# Patient Record
Sex: MALE | Race: WHITE | NOT HISPANIC OR LATINO | Employment: FULL TIME | URBAN - METROPOLITAN AREA
[De-identification: names, ages, dates, MRNs, and addresses within clinical notes are randomized per-mention and may not be internally consistent; named-entity substitution may affect disease eponyms.]

---

## 2017-01-27 ENCOUNTER — TELEPHONE (OUTPATIENT)
Dept: PRIMARY CARE CLINIC | Facility: CLINIC | Age: 42
End: 2017-01-27

## 2017-01-27 NOTE — TELEPHONE ENCOUNTER
----- Message from Emmanuelle Ambrose sent at 1/27/2017  3:52 PM CST -----  Contact: Self  Pt is calling requesting Staff contact regarding his prescription for Singular.  According to the the pharmacy, the insurance company needs Dr. Thakkar to initiate a 90 day prescription.    He can be reached at [phone].    Thank you.

## 2017-11-16 RX ORDER — ALBUTEROL SULFATE 90 UG/1
AEROSOL, METERED RESPIRATORY (INHALATION)
Qty: 8.5 INHALER | Refills: 0 | OUTPATIENT
Start: 2017-11-16

## 2017-11-27 ENCOUNTER — OFFICE VISIT (OUTPATIENT)
Dept: INTERNAL MEDICINE | Facility: CLINIC | Age: 42
End: 2017-11-27
Payer: COMMERCIAL

## 2017-11-27 ENCOUNTER — PATIENT MESSAGE (OUTPATIENT)
Dept: INTERNAL MEDICINE | Facility: CLINIC | Age: 42
End: 2017-11-27

## 2017-11-27 VITALS
TEMPERATURE: 98 F | SYSTOLIC BLOOD PRESSURE: 120 MMHG | HEART RATE: 60 BPM | HEIGHT: 69 IN | DIASTOLIC BLOOD PRESSURE: 80 MMHG | BODY MASS INDEX: 25.44 KG/M2 | WEIGHT: 171.75 LBS | OXYGEN SATURATION: 97 %

## 2017-11-27 DIAGNOSIS — J30.9 CHRONIC ALLERGIC RHINITIS, UNSPECIFIED SEASONALITY, UNSPECIFIED TRIGGER: ICD-10-CM

## 2017-11-27 DIAGNOSIS — Z00.00 HEALTH CARE MAINTENANCE: ICD-10-CM

## 2017-11-27 DIAGNOSIS — J06.9 UPPER RESPIRATORY TRACT INFECTION, UNSPECIFIED TYPE: Primary | ICD-10-CM

## 2017-11-27 DIAGNOSIS — K21.9 GASTROESOPHAGEAL REFLUX DISEASE, ESOPHAGITIS PRESENCE NOT SPECIFIED: ICD-10-CM

## 2017-11-27 PROCEDURE — 99214 OFFICE O/P EST MOD 30 MIN: CPT | Mod: S$GLB,,, | Performed by: INTERNAL MEDICINE

## 2017-11-27 PROCEDURE — 99999 PR PBB SHADOW E&M-EST. PATIENT-LVL III: CPT | Mod: PBBFAC,,, | Performed by: INTERNAL MEDICINE

## 2017-11-27 RX ORDER — MONTELUKAST SODIUM 5 MG/1
5 TABLET, CHEWABLE ORAL 2 TIMES DAILY
COMMUNITY
End: 2017-11-27 | Stop reason: SDUPTHER

## 2017-11-27 RX ORDER — MONTELUKAST SODIUM 5 MG/1
5 TABLET, CHEWABLE ORAL NIGHTLY
Qty: 30 TABLET | Refills: 11 | Status: SHIPPED | OUTPATIENT
Start: 2017-11-27 | End: 2019-07-12

## 2017-11-27 RX ORDER — HYDROGEN PEROXIDE 3 %
20 SOLUTION, NON-ORAL MISCELLANEOUS
Qty: 30 CAPSULE | Refills: 0 | Status: SHIPPED | OUTPATIENT
Start: 2017-11-27 | End: 2017-12-23 | Stop reason: SDUPTHER

## 2017-11-27 NOTE — PROGRESS NOTES
"Subjective:       Patient ID: Gayle Cline is a 41 y.o. male.    Chief Complaint: URI (pt complains of symptoms like nasal/chest congestion, excessive coughing, coughing up yellow mucus & irritable feeling in the laryngeal area. )    HPI   42 yo M here for urgent eval of URI sx with nasal and chest congestion, cough productive of yellow mucus and throat irritation.     Hx allergic rhinitis previously on singulair previously.     Started 4 days ago. Similar issue in August of last year that lasted through December.   claritin am and singulair qhs previously worked well.     Review of Systems   Constitutional: Negative for fever.   HENT: Positive for congestion and sore throat.    Respiratory: Positive for cough. Negative for shortness of breath.    Cardiovascular: Negative for chest pain.   Musculoskeletal: Negative.    Skin: Negative.        Objective:   /80 (BP Location: Right arm, Patient Position: Sitting, BP Method: Medium (Manual))   Pulse 60   Temp 98.2 °F (36.8 °C) (Oral)   Ht 5' 9" (1.753 m)   Wt 77.9 kg (171 lb 11.8 oz)   SpO2 97%   BMI 25.36 kg/m²      Physical Exam   Constitutional: He is oriented to person, place, and time. He appears well-developed and well-nourished. No distress.   HENT:   Head: Normocephalic and atraumatic.   Frequent dry cough/throat clearing   Cardiovascular: Normal rate and regular rhythm.    No murmur heard.  Pulmonary/Chest: Effort normal. No respiratory distress. He has no wheezes. He has no rales.   Neurological: He is alert and oriented to person, place, and time.   Skin: Skin is warm and dry. He is not diaphoretic.   Psychiatric: He has a normal mood and affect. His behavior is normal.       Small amount of exudate on right tonsil, cobblestoning    Assessment:       1. Upper respiratory tract infection, unspecified type    2. Chronic allergic rhinitis, unspecified seasonality, unspecified trigger    3. Gastroesophageal reflux disease, esophagitis presence not " specified    4. Health care maintenance        Plan:       Gayle was seen today for uri.    Diagnoses and all orders for this visit:    Upper respiratory tract infection, unspecified type  Supportive, rest, fluids, mucinex    Chronic allergic rhinitis, unspecified seasonality, unspecified trigger  -     montelukast (SINGULAIR) 5 MG chewable tablet; Take 1 tablet (5 mg total) by mouth every evening.    Gastroesophageal reflux disease, esophagitis presence not specified  -     esomeprazole (NEXIUM) 20 MG capsule; Take 1 capsule (20 mg total) by mouth before breakfast. X 4 weeks    Health care maintenance  -     CBC auto differential; Future  -     Comprehensive metabolic panel; Future  -     Hemoglobin A1c; Future  -     Lipid panel; Future  -     TSH; Future  -     HIV-1 and HIV-2 antibodies; Future  -     RPR; Future

## 2017-12-23 DIAGNOSIS — K21.9 GASTROESOPHAGEAL REFLUX DISEASE, ESOPHAGITIS PRESENCE NOT SPECIFIED: ICD-10-CM

## 2017-12-26 RX ORDER — HYDROGEN PEROXIDE 3 %
20 SOLUTION, NON-ORAL MISCELLANEOUS
Qty: 90 CAPSULE | Refills: 0 | Status: SHIPPED | OUTPATIENT
Start: 2017-12-26 | End: 2019-07-12

## 2019-04-02 ENCOUNTER — OFFICE VISIT (OUTPATIENT)
Dept: INTERNAL MEDICINE | Facility: CLINIC | Age: 44
End: 2019-04-02
Payer: COMMERCIAL

## 2019-04-02 VITALS
DIASTOLIC BLOOD PRESSURE: 80 MMHG | OXYGEN SATURATION: 98 % | BODY MASS INDEX: 25.44 KG/M2 | HEIGHT: 69 IN | WEIGHT: 171.75 LBS | TEMPERATURE: 98 F | SYSTOLIC BLOOD PRESSURE: 108 MMHG | HEART RATE: 69 BPM

## 2019-04-02 DIAGNOSIS — R05.9 COUGH: ICD-10-CM

## 2019-04-02 DIAGNOSIS — J30.9 CHRONIC ALLERGIC RHINITIS: ICD-10-CM

## 2019-04-02 DIAGNOSIS — G47.00 INSOMNIA, UNSPECIFIED TYPE: ICD-10-CM

## 2019-04-02 DIAGNOSIS — J40 BRONCHITIS: Primary | ICD-10-CM

## 2019-04-02 PROCEDURE — 3008F BODY MASS INDEX DOCD: CPT | Mod: CPTII,S$GLB,, | Performed by: NURSE PRACTITIONER

## 2019-04-02 PROCEDURE — 99213 PR OFFICE/OUTPT VISIT, EST, LEVL III, 20-29 MIN: ICD-10-PCS | Mod: 25,S$GLB,, | Performed by: NURSE PRACTITIONER

## 2019-04-02 PROCEDURE — 99213 OFFICE O/P EST LOW 20 MIN: CPT | Mod: 25,S$GLB,, | Performed by: NURSE PRACTITIONER

## 2019-04-02 PROCEDURE — 99999 PR PBB SHADOW E&M-EST. PATIENT-LVL III: CPT | Mod: PBBFAC,,, | Performed by: NURSE PRACTITIONER

## 2019-04-02 PROCEDURE — 96372 THER/PROPH/DIAG INJ SC/IM: CPT | Mod: S$GLB,,, | Performed by: NURSE PRACTITIONER

## 2019-04-02 PROCEDURE — 3008F PR BODY MASS INDEX (BMI) DOCUMENTED: ICD-10-PCS | Mod: CPTII,S$GLB,, | Performed by: NURSE PRACTITIONER

## 2019-04-02 PROCEDURE — 96372 PR INJECTION,THERAP/PROPH/DIAG2ST, IM OR SUBCUT: ICD-10-PCS | Mod: S$GLB,,, | Performed by: NURSE PRACTITIONER

## 2019-04-02 PROCEDURE — 99999 PR PBB SHADOW E&M-EST. PATIENT-LVL III: ICD-10-PCS | Mod: PBBFAC,,, | Performed by: NURSE PRACTITIONER

## 2019-04-02 RX ORDER — FLUTICASONE PROPIONATE 50 MCG
1 SPRAY, SUSPENSION (ML) NASAL DAILY
Qty: 16 G | Refills: 12 | Status: SHIPPED | OUTPATIENT
Start: 2019-04-02 | End: 2019-07-12 | Stop reason: SDUPTHER

## 2019-04-02 RX ORDER — AZITHROMYCIN 250 MG/1
TABLET, FILM COATED ORAL
Qty: 6 TABLET | Refills: 0 | Status: SHIPPED | OUTPATIENT
Start: 2019-04-02 | End: 2019-04-07

## 2019-04-02 RX ORDER — BETAMETHASONE SODIUM PHOSPHATE AND BETAMETHASONE ACETATE 3; 3 MG/ML; MG/ML
6 INJECTION, SUSPENSION INTRA-ARTICULAR; INTRALESIONAL; INTRAMUSCULAR; SOFT TISSUE
Status: COMPLETED | OUTPATIENT
Start: 2019-04-02 | End: 2019-04-02

## 2019-04-02 RX ORDER — BENZONATATE 100 MG/1
100 CAPSULE ORAL 3 TIMES DAILY PRN
Qty: 30 CAPSULE | Refills: 0 | Status: SHIPPED | OUTPATIENT
Start: 2019-04-02 | End: 2019-04-12

## 2019-04-02 RX ADMIN — BETAMETHASONE SODIUM PHOSPHATE AND BETAMETHASONE ACETATE 6 MG: 3; 3 INJECTION, SUSPENSION INTRA-ARTICULAR; INTRALESIONAL; INTRAMUSCULAR; SOFT TISSUE at 11:04

## 2019-04-02 NOTE — PROGRESS NOTES
INTERNAL MEDICINE URGENT CARE NOTE    CHIEF COMPLAINT     Chief Complaint   Patient presents with    Cough     x3 days    Sore Throat    Nasal Congestion    Chest Congestion       HPI     Gayle Cline is a 43 y.o. male with GERD and AR who presents for an urgent visit today.  He is an established pt of Dr Stock     Here with c/o sore throat, nasal congestion, chest congestion and cough x 6 days with acute worsening 2 days ago.   Cough- prod with yellow sputum   No wheezing or SOB.   No fever or chills.   Treating with mucinex multi-system yesterday     Also with c/o insomnia - trouble staying asleep. Treats with z-quil. +snoring       Past Medical History:  Past Medical History:   Diagnosis Date    Chronic allergic rhinitis        Home Medications:  Prior to Admission medications    Medication Sig Start Date End Date Taking? Authorizing Provider   esomeprazole (NEXIUM) 20 MG capsule Take 1 capsule (20 mg total) by mouth before breakfast. 12/26/17 12/26/18  Ada Stock MD   fluticasone (FLONASE) 50 mcg/actuation nasal spray 1 spray by Each Nare route once daily. Take during Spring and Fall. 10/18/16   Jorden Thakkar MD   loratadine (CLARITIN) 10 mg tablet Take 1 tablet (10 mg total) by mouth daily as needed for Allergies. 10/18/16 11/27/17  Jorden Thakkar MD   montelukast (SINGULAIR) 5 MG chewable tablet Take 1 tablet (5 mg total) by mouth every evening. 11/27/17   Ada Stock MD       Review of Systems:  Review of Systems   Constitutional: Negative for chills, fatigue and fever.   HENT: Positive for congestion, postnasal drip and sinus pressure. Negative for rhinorrhea, sinus pain and sore throat.    Respiratory: Positive for cough. Negative for shortness of breath and wheezing.    Cardiovascular: Negative for chest pain and palpitations.   Gastrointestinal: Negative for abdominal pain, constipation, diarrhea, nausea and vomiting.   Skin: Negative for rash.   Neurological: Positive for  "headaches. Negative for dizziness and light-headedness.       Health Maintainence:   Immunizations:  Health Maintenance       Date Due Completion Date    TETANUS VACCINE 12/24/1994 ---    Lipid Panel 07/09/2018 7/9/2013    Influenza Vaccine 08/01/2018 ---           PHYSICAL EXAM     /80 (BP Location: Left arm, Patient Position: Sitting, BP Method: Large (Manual))   Pulse 69   Temp 98.1 °F (36.7 °C) (Oral)   Ht 5' 9" (1.753 m)   Wt 77.9 kg (171 lb 11.8 oz)   SpO2 98%   BMI 25.36 kg/m²     Physical Exam   Constitutional: He is oriented to person, place, and time. He appears well-developed and well-nourished.   HENT:   Head: Normocephalic.   Right Ear: External ear normal. Tympanic membrane is erythematous.   Left Ear: External ear normal. Tympanic membrane is erythematous.   Nose: Right sinus exhibits maxillary sinus tenderness and frontal sinus tenderness. Left sinus exhibits maxillary sinus tenderness and frontal sinus tenderness.   Mouth/Throat: Oropharynx is clear and moist and mucous membranes are normal. No oropharyngeal exudate.   Eyes: Pupils are equal, round, and reactive to light.   Neck: No JVD present. No tracheal deviation present. No thyromegaly present.   Cardiovascular: Normal rate, regular rhythm and intact distal pulses. Exam reveals no gallop and no friction rub.   No murmur heard.  Pulmonary/Chest: No respiratory distress. He has wheezes. He has no rales. He exhibits no tenderness.   Abdominal: Soft. Bowel sounds are normal. He exhibits no distension. There is no tenderness.   Musculoskeletal: Normal range of motion. He exhibits no edema or tenderness.   Lymphadenopathy:     He has no cervical adenopathy.   Neurological: He is alert and oriented to person, place, and time.   Skin: Skin is warm and dry. No rash noted.   Psychiatric: He has a normal mood and affect. His behavior is normal.   Vitals reviewed.      LABS     No results found for: LABA1C, HGBA1C  CMP  Sodium   Date Value Ref " Range Status   07/09/2013 137 136 - 145 mmol/L Final     Potassium   Date Value Ref Range Status   07/09/2013 4.4 3.5 - 5.1 mmol/L Final     Chloride   Date Value Ref Range Status   07/09/2013 102 95 - 110 mmol/L Final     CO2   Date Value Ref Range Status   07/09/2013 26 23 - 29 mmol/L Final     Glucose   Date Value Ref Range Status   07/09/2013 88 70 - 110 mg/dl Final     BUN, Bld   Date Value Ref Range Status   07/09/2013 13 6 - 20 mg/dl Final     Creatinine   Date Value Ref Range Status   07/09/2013 1.0 0.5 - 1.4 mg/dl Final     Calcium   Date Value Ref Range Status   07/09/2013 10.2 8.7 - 10.5 mg/dl Final     Total Protein   Date Value Ref Range Status   07/09/2013 7.4 6.0 - 8.4 g/dL Final     Albumin   Date Value Ref Range Status   07/09/2013 4.5 3.5 - 5.2 g/dl Final     Total Bilirubin   Date Value Ref Range Status   07/09/2013 0.9 0.1 - 1.0 mg/dl Final     Comment:     For infants and newborns, interpretation of results should be based  on gestational age, weight and in agreement with clinical  observations.  .  Premature Infant recommended reference ranges:  Up to 24 hours.............<8.0 mg/dl  Up to 48 hours............<12.0 mg/dl  3-5 days..................<15.0 mg/dl  6-29 days.................<15.0 mg/dl     Alkaline Phosphatase   Date Value Ref Range Status   07/09/2013 35 (L) 55 - 135 U/L Final     AST   Date Value Ref Range Status   07/09/2013 24 10 - 40 U/L Final     ALT   Date Value Ref Range Status   07/09/2013 21 10 - 44 U/L Final     Anion Gap   Date Value Ref Range Status   07/09/2013 9.0 8 - 16 mmol/L Final     eGFR if    Date Value Ref Range Status   07/09/2013 >60 >60 mL/min Final     Comment:     Estimated glomerular filtration rate (eGFR) is normalized to an  average body surface area of 1.73 square meters.  The calculation  used to obtain the eGFR is the adjusted MDRD equation, which factors  patient sex, age, race, and creatinine result.  Since race is unknown  in our  information system, the eGFR values for -American  and Non--American patients are given for each creatinine  result.     eGFR if non    Date Value Ref Range Status   07/09/2013 >60 >60 mL/min Final     Lab Results   Component Value Date    WBC 7.47 07/09/2013    HGB 14.1 07/09/2013    HCT 43.2 07/09/2013    MCV 88.5 07/09/2013     07/09/2013     Lab Results   Component Value Date    CHOL 219 (H) 07/09/2013     Lab Results   Component Value Date    HDL 55 07/09/2013     Lab Results   Component Value Date    LDLCALC 153.0 07/09/2013     Lab Results   Component Value Date    TRIG 53 07/09/2013     Lab Results   Component Value Date    CHOLHDL 25.1 07/09/2013     No results found for: TSH, P0NIGRU, B9YNJVW, THYROIDAB    ASSESSMENT/PLAN     Gayle Cline is a 43 y.o. male with  Past Medical History:   Diagnosis Date    Chronic allergic rhinitis      Bronchitis- will start azithromycin as directed. [facility] IM in office. Tessalon pearls as needed. Mucinex bid with full glass of water   -     azithromycin (Z-ANISH) 250 MG tablet; Take 2 tablets by mouth on day 1; Take 1 tablet by mouth on days 2-5  Dispense: 6 tablet; Refill: 0  -     betamethasone acetate-betamethasone sodium phosphate injection 6 mg  -     benzonatate (TESSALON) 100 MG capsule; Take 1 capsule (100 mg total) by mouth 3 (three) times daily as needed.  Dispense: 30 capsule; Refill: 0    Cough  -     betamethasone acetate-betamethasone sodium phosphate injection 6 mg  -     benzonatate (TESSALON) 100 MG capsule; Take 1 capsule (100 mg total) by mouth 3 (three) times daily as needed.  Dispense: 30 capsule; Refill: 0    Chronic allergic rhinitis- cont Flonase. Will hold Claritin x1 week   -     fluticasone (FLONASE) 50 mcg/actuation nasal spray; 1 spray (50 mcg total) by Each Nare route once daily. Take during Spring and Fall.  Dispense: 16 g; Refill: 12    Insomnia- will start melatonin 5mg and titrate up to 10mg as  needed. F/u with PCP     Follow up as needed and with PCP     Patient education provided from Karrie. Patient was counseled on when and how to seek emergent care.       Evangelina AMADOR, THIEN, FNP-c   Department of Internal Medicine - [facility]  11:36 AM

## 2019-07-12 ENCOUNTER — TELEPHONE (OUTPATIENT)
Dept: INTERNAL MEDICINE | Facility: CLINIC | Age: 44
End: 2019-07-12

## 2019-07-12 ENCOUNTER — LAB VISIT (OUTPATIENT)
Dept: LAB | Facility: HOSPITAL | Age: 44
End: 2019-07-12
Attending: INTERNAL MEDICINE
Payer: COMMERCIAL

## 2019-07-12 ENCOUNTER — OFFICE VISIT (OUTPATIENT)
Dept: INTERNAL MEDICINE | Facility: CLINIC | Age: 44
End: 2019-07-12
Payer: COMMERCIAL

## 2019-07-12 VITALS
SYSTOLIC BLOOD PRESSURE: 112 MMHG | OXYGEN SATURATION: 98 % | BODY MASS INDEX: 26.38 KG/M2 | HEIGHT: 69 IN | HEART RATE: 63 BPM | DIASTOLIC BLOOD PRESSURE: 74 MMHG | WEIGHT: 178.13 LBS

## 2019-07-12 DIAGNOSIS — Z76.89 ESTABLISHING CARE WITH NEW DOCTOR, ENCOUNTER FOR: ICD-10-CM

## 2019-07-12 DIAGNOSIS — E55.9 VITAMIN D INSUFFICIENCY: ICD-10-CM

## 2019-07-12 DIAGNOSIS — Z76.89 ESTABLISHING CARE WITH NEW DOCTOR, ENCOUNTER FOR: Primary | ICD-10-CM

## 2019-07-12 DIAGNOSIS — Z00.00 ANNUAL PHYSICAL EXAM: ICD-10-CM

## 2019-07-12 DIAGNOSIS — J30.9 CHRONIC ALLERGIC RHINITIS: ICD-10-CM

## 2019-07-12 LAB
25(OH)D3+25(OH)D2 SERPL-MCNC: 20 NG/ML (ref 30–96)
ALBUMIN SERPL BCP-MCNC: 4.5 G/DL (ref 3.5–5.2)
ALP SERPL-CCNC: 41 U/L (ref 55–135)
ALT SERPL W/O P-5'-P-CCNC: 15 U/L (ref 10–44)
ANION GAP SERPL CALC-SCNC: 9 MMOL/L (ref 8–16)
AST SERPL-CCNC: 20 U/L (ref 10–40)
BASOPHILS # BLD AUTO: 0.07 K/UL (ref 0–0.2)
BASOPHILS NFR BLD: 0.8 % (ref 0–1.9)
BILIRUB SERPL-MCNC: 0.6 MG/DL (ref 0.1–1)
BUN SERPL-MCNC: 8 MG/DL (ref 6–20)
CALCIUM SERPL-MCNC: 10 MG/DL (ref 8.7–10.5)
CHLORIDE SERPL-SCNC: 105 MMOL/L (ref 95–110)
CHOLEST SERPL-MCNC: 226 MG/DL (ref 120–199)
CHOLEST/HDLC SERPL: 3.4 {RATIO} (ref 2–5)
CO2 SERPL-SCNC: 27 MMOL/L (ref 23–29)
COMPLEXED PSA SERPL-MCNC: 0.4 NG/ML (ref 0–4)
CREAT SERPL-MCNC: 0.9 MG/DL (ref 0.5–1.4)
DIFFERENTIAL METHOD: NORMAL
EOSINOPHIL # BLD AUTO: 0.2 K/UL (ref 0–0.5)
EOSINOPHIL NFR BLD: 1.8 % (ref 0–8)
ERYTHROCYTE [DISTWIDTH] IN BLOOD BY AUTOMATED COUNT: 12.7 % (ref 11.5–14.5)
EST. GFR  (AFRICAN AMERICAN): >60 ML/MIN/1.73 M^2
EST. GFR  (NON AFRICAN AMERICAN): >60 ML/MIN/1.73 M^2
ESTIMATED AVG GLUCOSE: 105 MG/DL (ref 68–131)
GLUCOSE SERPL-MCNC: 94 MG/DL (ref 70–110)
HBA1C MFR BLD HPLC: 5.3 % (ref 4–5.6)
HCT VFR BLD AUTO: 44.3 % (ref 40–54)
HDLC SERPL-MCNC: 67 MG/DL (ref 40–75)
HDLC SERPL: 29.6 % (ref 20–50)
HGB BLD-MCNC: 14.7 G/DL (ref 14–18)
LDLC SERPL CALC-MCNC: 144.6 MG/DL (ref 63–159)
LYMPHOCYTES # BLD AUTO: 1.8 K/UL (ref 1–4.8)
LYMPHOCYTES NFR BLD: 21.5 % (ref 18–48)
MCH RBC QN AUTO: 29.9 PG (ref 27–31)
MCHC RBC AUTO-ENTMCNC: 33.2 G/DL (ref 32–36)
MCV RBC AUTO: 90 FL (ref 82–98)
MONOCYTES # BLD AUTO: 0.6 K/UL (ref 0.3–1)
MONOCYTES NFR BLD: 7.1 % (ref 4–15)
NEUTROPHILS # BLD AUTO: 5.7 K/UL (ref 1.8–7.7)
NEUTROPHILS NFR BLD: 68.8 % (ref 38–73)
NONHDLC SERPL-MCNC: 159 MG/DL
PLATELET # BLD AUTO: 274 K/UL (ref 150–350)
PMV BLD AUTO: 10.2 FL (ref 9.2–12.9)
POTASSIUM SERPL-SCNC: 4.4 MMOL/L (ref 3.5–5.1)
PROT SERPL-MCNC: 7.7 G/DL (ref 6–8.4)
RBC # BLD AUTO: 4.92 M/UL (ref 4.6–6.2)
SODIUM SERPL-SCNC: 141 MMOL/L (ref 136–145)
TRIGL SERPL-MCNC: 72 MG/DL (ref 30–150)
TSH SERPL DL<=0.005 MIU/L-ACNC: 1.3 UIU/ML (ref 0.4–4)
WBC # BLD AUTO: 8.33 K/UL (ref 3.9–12.7)

## 2019-07-12 PROCEDURE — 99396 PREV VISIT EST AGE 40-64: CPT | Mod: S$GLB,,, | Performed by: INTERNAL MEDICINE

## 2019-07-12 PROCEDURE — 86592 SYPHILIS TEST NON-TREP QUAL: CPT

## 2019-07-12 PROCEDURE — 86696 HERPES SIMPLEX TYPE 2 TEST: CPT

## 2019-07-12 PROCEDURE — 99999 PR PBB SHADOW E&M-EST. PATIENT-LVL III: CPT | Mod: PBBFAC,,, | Performed by: INTERNAL MEDICINE

## 2019-07-12 PROCEDURE — 80053 COMPREHEN METABOLIC PANEL: CPT

## 2019-07-12 PROCEDURE — 84443 ASSAY THYROID STIM HORMONE: CPT

## 2019-07-12 PROCEDURE — 80061 LIPID PANEL: CPT

## 2019-07-12 PROCEDURE — 86703 HIV-1/HIV-2 1 RESULT ANTBDY: CPT

## 2019-07-12 PROCEDURE — 36415 COLL VENOUS BLD VENIPUNCTURE: CPT

## 2019-07-12 PROCEDURE — 99999 PR PBB SHADOW E&M-EST. PATIENT-LVL III: ICD-10-PCS | Mod: PBBFAC,,, | Performed by: INTERNAL MEDICINE

## 2019-07-12 PROCEDURE — 99396 PR PREVENTIVE VISIT,EST,40-64: ICD-10-PCS | Mod: S$GLB,,, | Performed by: INTERNAL MEDICINE

## 2019-07-12 PROCEDURE — 83036 HEMOGLOBIN GLYCOSYLATED A1C: CPT

## 2019-07-12 PROCEDURE — 82306 VITAMIN D 25 HYDROXY: CPT

## 2019-07-12 PROCEDURE — 84153 ASSAY OF PSA TOTAL: CPT

## 2019-07-12 PROCEDURE — 85025 COMPLETE CBC W/AUTO DIFF WBC: CPT

## 2019-07-12 RX ORDER — DEXAMETHASONE 4 MG/1
4 TABLET ORAL EVERY 8 HOURS
Qty: 9 TABLET | Refills: 0 | Status: SHIPPED | OUTPATIENT
Start: 2019-07-12 | End: 2019-07-15

## 2019-07-12 RX ORDER — ACETAMINOPHEN 500 MG
1 TABLET ORAL DAILY
Qty: 90 CAPSULE | Refills: 3 | COMMUNITY
Start: 2019-07-12 | End: 2019-07-12 | Stop reason: SDUPTHER

## 2019-07-12 RX ORDER — IBUPROFEN 100 MG/5ML
2000 SUSPENSION, ORAL (FINAL DOSE FORM) ORAL DAILY
COMMUNITY
Start: 2019-07-12 | End: 2019-11-08

## 2019-07-12 RX ORDER — FLUTICASONE PROPIONATE 50 MCG
1 SPRAY, SUSPENSION (ML) NASAL DAILY
Qty: 3 BOTTLE | Refills: 3 | Status: SHIPPED | OUTPATIENT
Start: 2019-07-12 | End: 2020-10-27 | Stop reason: SDUPTHER

## 2019-07-12 RX ORDER — ACETAMINOPHEN 500 MG
1 TABLET ORAL DAILY
Qty: 90 CAPSULE | Refills: 3 | Status: SHIPPED | OUTPATIENT
Start: 2019-07-12

## 2019-07-12 NOTE — PROGRESS NOTES
INTERNAL MEDICINE CLINIC    Initial Visit to Establish Care    PRESENTING HISTORY     Previous PCP: Jorden Thakkar MD  Chief Complaint/Reason for Visit:     Chief Complaint   Patient presents with    Annual Exam     History of Present Illness & ROS : Mr. Gayle Cline is a 43 y.o. male.      Currently with right red eye.  He has increased allergy. Nose congested.  No coughing.    Review of Systems:  Answers for HPI/ROS submitted by the patient on 7/11/2019   activity change: No  unexpected weight change: No  neck pain: No  hearing loss: No  rhinorrhea: No  trouble swallowing: No  eye discharge: No  visual disturbance: No  chest tightness: No  wheezing: No  chest pain: No  palpitations: No  blood in stool: No  constipation: No  vomiting: No  diarrhea: No  polydipsia: No  polyuria: No  difficulty urinating: No  urgency: No  hematuria: No  joint swelling: No  arthralgias: No  headaches: No  weakness: No  confusion: No  dysphoric mood: No      PAST HISTORY:     Past Medical History:   Diagnosis Date    Chronic allergic rhinitis     Gastroesophageal reflux disease without esophagitis 10/18/2016    HPV in male     Rectal. Follow by Colonrectal        Past Surgical History:   Procedure Laterality Date    CHOLECYSTECTOMY  2012    Outside facilty       Family History   Problem Relation Age of Onset    Diabetes Father 60    Obesity Father     Stroke Maternal Grandfather     Heart disease Paternal Grandfather 60    Heart disease Maternal Uncle 40        CABG       Social History     Socioeconomic History    Marital status:      Spouse name: Magnolia    Number of children: Not on file    Years of education: Not on file    Highest education level: Not on file   Occupational History    Not on file   Social Needs    Financial resource strain: Not hard at all    Food insecurity:     Worry: Never true     Inability: Never true    Transportation needs:     Medical: No     Non-medical: No   Tobacco Use     Smoking status: Never Smoker    Smokeless tobacco: Never Used   Substance and Sexual Activity    Alcohol use: No     Frequency: Never     Drinks per session: Patient refused     Binge frequency: Never    Drug use: No    Sexual activity: Yes     Partners: Female   Lifestyle    Physical activity:     Days per week: 3 days     Minutes per session: 10 min    Stress: Only a little   Relationships    Social connections:     Talks on phone: More than three times a week     Gets together: Once a week     Attends Baptism service: Not on file     Active member of club or organization: No     Attends meetings of clubs or organizations: Never     Relationship status:    Other Topics Concern    Not on file   Social History Narrative    : Magnolia Xavier    One step son     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 Take during Spring and Fall. 16 g 12           loratadine (CLARITIN) 10 mg tablet Take 1 tablet (10 mg total) by mouth daily as needed for Allergies.  0            No current facility-administered medications on file prior to visit.         Review of patient's allergies indicates:  No Known Allergies    Medications Reconciliation:   I have reconciled the patient's home medications with the patient/family. I have updated all changes.  Refer to After-Visit Medication List.    OBJECTIVE:     Vital Signs:  Vitals:    07/12/19 1036   BP: 112/74   Pulse: 63     Wt Readings from Last 1 Encounters:   07/12/19 1036 80.8 kg (178 lb 2.1 oz)     Body mass index is 26.31 kg/m².     Physical Exam:  General: Well developed, well nourished. No distress.  HEENT: Head is normocephalic, atraumatic; ears are normal.    Eyes: Clear conjunctiva.  Neck: Supple, symmetrical neck; trachea  midline.  Lungs: Clear to auscultation bilaterally and normal respiratory effort.  Cardiovascular: Heart with regular rate and rhythm.    Extremities: No LE edema.    Abdomen: Abdomen is soft, non-tender non-distended with normal bowel sounds.  Musculoskeletal: Normal gait.   Genital:  Normal penis. No rash.  Scrotum and epididymis normal. Old small 2 mm fibrosis tissue on right scrotum (chronic).  No inguinal hernia.  No inguinal nodes. No rash found.  Rectal: normal prosate  Lymph Nodes: No cervical, supraclavicular or axillary adenopathy.  Psychiatric: Normal affect. Alert.    Laboratory  Lab Results   Component Value Date    WBC 7.47 07/09/2013    HGB 14.1 07/09/2013    HCT 43.2 07/09/2013     07/09/2013    CHOL 219 (H) 07/09/2013    TRIG 53 07/09/2013    HDL 55 07/09/2013    ALT 21 07/09/2013    AST 24 07/09/2013     07/09/2013    K 4.4 07/09/2013     07/09/2013    CREATININE 1.0 07/09/2013    BUN 13 07/09/2013    CO2 26 07/09/2013       ASSESSMENT & PLAN:     Establishing care with new doctor, encounter for  Annual physical exam  - Reviewed and updated past and current medical problems.  Discussed treatment of current medical problems    -     Lipid panel; Future; Expected date: 07/12/2019  -     CBC auto differential; Future; Expected date: 07/12/2019  -     Comprehensive metabolic panel; Future; Expected date: 07/12/2019  -     TSH; Future; Expected date: 07/12/2019  -     Hemoglobin A1c; Future; Expected date: 07/12/2019  -     PSA, Screening; Future; Expected date: 07/12/2019  -     Vitamin D; Future; Expected date: 01/08/2020  -     HIV 1/2 Ag/Ab (4th Gen); Future; Expected date: 07/12/2019  -     RPR; Future; Expected date: 07/12/2019  -     HERPES SIMPLEX 1&2 IGG; Future; Expected date: 07/12/2019    Chronic allergic rhinitis  -     fluticasone propionate (FLONASE) 50 mcg/actuation nasal spray; 1 spray (50 mcg total) by Each Nare route once daily. Take during Spring and Fall.   Dispense: 3 Bottle; Refill: 3  -     ascorbic acid, vitamin C, (VITAMIN C) 1000 MG tablet; Take 2 tablets (2,000 mg total) by mouth once daily.    Vitamin D Insufficiency  Rx Vitamin D3 2000 IU daily    Preventive Health Maintenance:  Tdap    Return to Clinic for Follow Up with me:  1 year.     Scheduled Follow-up :  No future appointments.    After Visit Medication List :     Medication List           Accurate as of 7/12/19 11:15 AM. If you have any questions, ask your nurse or doctor.               START taking these medications    ascorbic acid (vitamin C) 1000 MG tablet  Commonly known as:  VITAMIN C  Take 2 tablets (2,000 mg total) by mouth once daily.  Started by:  Jorden Thakkar MD     dexAMETHasone 4 MG Tab  Commonly known as:  DECADRON  Take 1 tablet (4 mg total) by mouth every 8 (eight) hours. for 3 days  Started by:  Jorden Thakkar MD        CONTINUE taking these medications    fluticasone propionate 50 mcg/actuation nasal spray  Commonly known as:  FLONASE  1 spray (50 mcg total) by Each Nare route once daily. Take during Spring and Fall.     loratadine 10 mg tablet  Commonly known as:  CLARITIN  Take 1 tablet (10 mg total) by mouth daily as needed for Allergies.        STOP taking these medications    esomeprazole 20 MG capsule  Commonly known as:  NEXIUM  Stopped by:  Jorden Thakkar MD     montelukast 5 MG chewable tablet  Commonly known as:  SINGULAIR  Stopped by:  Jorden Thakkar MD           Where to Get Your Medications      These medications were sent to [facility]/pharmacy #[id number] - [place], [place] - [address] AT [facility]  [address], [place] [address]    Phone:  [phone]   · fluticasone propionate 50 mcg/actuation nasal spray     These medications were sent to [facility]  [address] [place] [id number]    Hours:  Mon-Fri, 8a-5:30p Phone:  [phone]   · dexAMETHasone 4 MG Tab     You can get these medications from any pharmacy    You don't need a  prescription for these medications  · ascorbic acid (vitamin C) 1000 MG tablet         Signing Physician:  Jorden Thakkar MD

## 2019-07-15 LAB
HIV 1+2 AB+HIV1 P24 AG SERPL QL IA: NEGATIVE
RPR SER QL: NORMAL

## 2019-07-19 LAB
HSV1 IGG SERPL QL IA: NEGATIVE
HSV2 IGG SERPL QL IA: NEGATIVE

## 2019-08-12 ENCOUNTER — PATIENT MESSAGE (OUTPATIENT)
Dept: INTERNAL MEDICINE | Facility: CLINIC | Age: 44
End: 2019-08-12

## 2019-08-12 DIAGNOSIS — J30.9 CHRONIC ALLERGIC RHINITIS: Primary | ICD-10-CM

## 2019-08-12 DIAGNOSIS — F51.01 PRIMARY INSOMNIA: ICD-10-CM

## 2019-08-12 RX ORDER — NORTRIPTYLINE HYDROCHLORIDE 10 MG/1
10 CAPSULE ORAL NIGHTLY
Qty: 30 CAPSULE | Refills: 11 | Status: SHIPPED | OUTPATIENT
Start: 2019-08-12 | End: 2019-08-28

## 2019-08-13 ENCOUNTER — PATIENT MESSAGE (OUTPATIENT)
Dept: INTERNAL MEDICINE | Facility: CLINIC | Age: 44
End: 2019-08-13

## 2019-08-28 ENCOUNTER — PATIENT MESSAGE (OUTPATIENT)
Dept: INTERNAL MEDICINE | Facility: CLINIC | Age: 44
End: 2019-08-28

## 2019-08-28 DIAGNOSIS — R41.840 INATTENTION: Primary | ICD-10-CM

## 2019-08-28 RX ORDER — BUPROPION HYDROCHLORIDE 150 MG/1
150 TABLET ORAL NIGHTLY
Qty: 30 TABLET | Refills: 11 | Status: SHIPPED | OUTPATIENT
Start: 2019-08-28 | End: 2019-11-08

## 2019-10-14 ENCOUNTER — PATIENT OUTREACH (OUTPATIENT)
Dept: ADMINISTRATIVE | Facility: HOSPITAL | Age: 44
End: 2019-10-14

## 2019-10-24 ENCOUNTER — OFFICE VISIT (OUTPATIENT)
Dept: INTERNAL MEDICINE | Facility: CLINIC | Age: 44
End: 2019-10-24
Payer: COMMERCIAL

## 2019-10-24 ENCOUNTER — OFFICE VISIT (OUTPATIENT)
Dept: PULMONOLOGY | Facility: CLINIC | Age: 44
End: 2019-10-24
Payer: COMMERCIAL

## 2019-10-24 ENCOUNTER — PATIENT MESSAGE (OUTPATIENT)
Dept: INTERNAL MEDICINE | Facility: CLINIC | Age: 44
End: 2019-10-24

## 2019-10-24 VITALS
HEIGHT: 69 IN | OXYGEN SATURATION: 98 % | SYSTOLIC BLOOD PRESSURE: 124 MMHG | HEART RATE: 66 BPM | WEIGHT: 178 LBS | DIASTOLIC BLOOD PRESSURE: 88 MMHG | BODY MASS INDEX: 26.36 KG/M2

## 2019-10-24 VITALS
DIASTOLIC BLOOD PRESSURE: 82 MMHG | HEIGHT: 69 IN | HEART RATE: 64 BPM | OXYGEN SATURATION: 98 % | WEIGHT: 173.75 LBS | BODY MASS INDEX: 25.73 KG/M2 | SYSTOLIC BLOOD PRESSURE: 118 MMHG

## 2019-10-24 DIAGNOSIS — J32.9 CHRONIC SINUSITIS WITH RECURRENT BRONCHITIS: Primary | ICD-10-CM

## 2019-10-24 DIAGNOSIS — J40 BRONCHITIS: ICD-10-CM

## 2019-10-24 DIAGNOSIS — R05.9 COUGH: ICD-10-CM

## 2019-10-24 DIAGNOSIS — R05.9 COUGH: Primary | ICD-10-CM

## 2019-10-24 DIAGNOSIS — G47.00 INSOMNIA, UNSPECIFIED TYPE: ICD-10-CM

## 2019-10-24 DIAGNOSIS — J40 CHRONIC SINUSITIS WITH RECURRENT BRONCHITIS: Primary | ICD-10-CM

## 2019-10-24 PROCEDURE — 3008F BODY MASS INDEX DOCD: CPT | Mod: CPTII,S$GLB,, | Performed by: NURSE PRACTITIONER

## 2019-10-24 PROCEDURE — 99999 PR PBB SHADOW E&M-EST. PATIENT-LVL III: CPT | Mod: PBBFAC,,, | Performed by: NURSE PRACTITIONER

## 2019-10-24 PROCEDURE — 3008F PR BODY MASS INDEX (BMI) DOCUMENTED: ICD-10-PCS | Mod: CPTII,S$GLB,, | Performed by: NURSE PRACTITIONER

## 2019-10-24 PROCEDURE — 99204 PR OFFICE/OUTPT VISIT, NEW, LEVL IV, 45-59 MIN: ICD-10-PCS | Mod: S$GLB,,, | Performed by: NURSE PRACTITIONER

## 2019-10-24 PROCEDURE — 99204 OFFICE O/P NEW MOD 45 MIN: CPT | Mod: S$GLB,,, | Performed by: NURSE PRACTITIONER

## 2019-10-24 PROCEDURE — 99214 OFFICE O/P EST MOD 30 MIN: CPT | Mod: S$GLB,,, | Performed by: NURSE PRACTITIONER

## 2019-10-24 PROCEDURE — 99999 PR PBB SHADOW E&M-EST. PATIENT-LVL IV: CPT | Mod: PBBFAC,,, | Performed by: NURSE PRACTITIONER

## 2019-10-24 PROCEDURE — 99999 PR PBB SHADOW E&M-EST. PATIENT-LVL IV: ICD-10-PCS | Mod: PBBFAC,,, | Performed by: NURSE PRACTITIONER

## 2019-10-24 PROCEDURE — 99214 PR OFFICE/OUTPT VISIT, EST, LEVL IV, 30-39 MIN: ICD-10-PCS | Mod: S$GLB,,, | Performed by: NURSE PRACTITIONER

## 2019-10-24 PROCEDURE — 99999 PR PBB SHADOW E&M-EST. PATIENT-LVL III: ICD-10-PCS | Mod: PBBFAC,,, | Performed by: NURSE PRACTITIONER

## 2019-10-24 RX ORDER — HYDROXYZINE PAMOATE 50 MG/1
CAPSULE ORAL
Qty: 30 CAPSULE | Refills: 0 | Status: SHIPPED | OUTPATIENT
Start: 2019-10-24 | End: 2019-11-08

## 2019-10-24 RX ORDER — ALBUTEROL SULFATE 90 UG/1
2 AEROSOL, METERED RESPIRATORY (INHALATION)
Qty: 1 INHALER | Refills: 3 | Status: SHIPPED | OUTPATIENT
Start: 2019-10-24 | End: 2019-11-08

## 2019-10-24 RX ORDER — CODEINE PHOSPHATE AND GUAIFENESIN 10; 100 MG/5ML; MG/5ML
10 SOLUTION ORAL EVERY 8 HOURS PRN
Qty: 210 ML | Refills: 0 | Status: SHIPPED | OUTPATIENT
Start: 2019-10-24 | End: 2019-10-31

## 2019-10-24 RX ORDER — DOXYCYCLINE HYCLATE 100 MG
100 TABLET ORAL 2 TIMES DAILY
Qty: 14 TABLET | Refills: 0 | Status: SHIPPED | OUTPATIENT
Start: 2019-10-24 | End: 2019-10-31

## 2019-10-24 RX ORDER — PREDNISONE 20 MG/1
40 TABLET ORAL DAILY
Qty: 10 TABLET | Refills: 0 | Status: SHIPPED | OUTPATIENT
Start: 2019-10-24 | End: 2019-10-29

## 2019-10-24 NOTE — PROGRESS NOTES
"INTERNAL MEDICINE CLINIC - SAME DAY APPOINTMENT  Progress Note    PRESENTING HISTORY     PCP: Jorden Thakkar MD  Chief Complaint/Reason for Visit:   No chief complaint on file.      History of Present Illness & ROS : Mr. Gayle Cline is a 43 y.o. male.    Here for UC Visit.   Reports that has been having a cough and sorethroat that has not gotten better since last being seen in Sept for this, outside of [facility]. Describes "cough up" as "dark yellow" in color.   No fever or chills.   No SOB or chest discomforts.     Additionally,having increase "issues with sleep".   Trying Wellbutrin prescribed by Dr. Thakkar, but does not feel working very well. Has tired Melatonin (as much as 10 mg) in the past.      Review of Systems:  Eyes: denies visual changes at this time denies floaters   Cardiovascular: no chest pain or palpitations  Gastrointestinal: no nausea or vomiting, no abdominal pain or change in bowel habits  Genitourinary: no hematuria or dysuria; denies frequency  Hematologic/Lymphatic: no easy bruising or lymphadenopathy  Musculoskeletal: no arthralgias or myalgias  Neurological: no seizures or tremors  Endocrine: no heat or cold intolerance      PAST HISTORY:     Past Medical History:   Diagnosis Date    Chronic allergic rhinitis     Gastroesophageal reflux disease without esophagitis 10/18/2016    HPV in male     Rectal. Follow by Colonrectal     Vitamin D insufficiency 7/12/2019       Past Surgical History:   Procedure Laterality Date    CHOLECYSTECTOMY  2012    Outside facilty       Family History   Problem Relation Age of Onset    Diabetes Father 60    Obesity Father     Stroke Maternal Grandfather     Heart disease Paternal Grandfather 60    Heart disease Maternal Uncle 40        CABG       Social History     Socioeconomic History    Marital status:      Spouse name: Magnolia    Number of children: Not on file    Years of education: Not on file    Highest education level: Not on file "   Occupational History    Not on file   Social Needs    Financial resource strain: Not hard at all    Food insecurity:     Worry: Never true     Inability: Never true    Transportation needs:     Medical: No     Non-medical: No   Tobacco Use    Smoking status: Never Smoker    Smokeless tobacco: Never Used   Substance and Sexual Activity    Alcohol use: No     Frequency: Never     Drinks per session: Patient refused     Binge frequency: Never    Drug use: No    Sexual activity: Yes     Partners: Female   Lifestyle    Physical activity:     Days per week: 3 days     Minutes per session: 10 min    Stress: Only a little   Relationships    Social connections:     Talks on phone: More than three times a week     Gets together: Once a week     Attends Bahai service: Not on file     Active member of club or organization: No     Attends meetings of clubs or organizations: Never     Relationship status:    Other Topics Concern    Not on file   Social History Narrative    : Magnolia Xavier    One step son     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      buPROPion (WELLBUTRIN XL) 150 MG TB24 tablet Take 1 tablet (150 mg total) by mouth every evening. 30 tablet 11    cholecalciferol, vitamin D3, (VITAMIN D3) 2,000 unit Cap Take 1 capsule (2,000 Units total) by mouth once daily. 90 capsule 3    fluticasone propionate (FLONASE) 50 mcg/actuation nasal spray 1 spray (50 mcg total) by Each Nare route once daily. Take during Spring and Fall. 3 Bottle 3    loratadine (CLARITIN) 10 mg tablet Take 1 tablet (10 mg total) by mouth daily as needed for Allergies.  0     No current facility-administered medications on file prior to visit.         Review of patient's allergies  indicates:  No Known Allergies    Medications Reconciliation:   I have reconciled the patient's home medications with the patient/family. I have updated all changes.  Refer to After-Visit Medication List.    OBJECTIVE:     Vital Signs:  There were no vitals filed for this visit.  Wt Readings from Last 1 Encounters:   07/12/19 1036 80.8 kg (178 lb 2.1 oz)     There is no height or weight on file to calculate BMI.   Wt Readings from Last 3 Encounters:   10/24/19 80.7 kg (178 lb)   07/12/19 80.8 kg (178 lb 2.1 oz)   04/02/19 77.9 kg (171 lb 11.8 oz)     Temp Readings from Last 3 Encounters:   04/02/19 98.1 °F (36.7 °C) (Oral)   11/27/17 98.2 °F (36.8 °C) (Oral)   09/13/16 98.1 °F (36.7 °C) (Oral)     BP Readings from Last 3 Encounters:   10/24/19 124/88   07/12/19 112/74   04/02/19 108/80     Pulse Readings from Last 3 Encounters:   10/24/19 66   07/12/19 63   04/02/19 69       Physical Exam:  General: Well developed, well nourished. No distress.  HEENT: Head is normocephalic, atraumatic; ears are normal.   Eyes: Clear conjunctiva.  Neck: Supple, symmetrical neck; trachea midline.  Lungs:  Mild distant expiratory wheezing to LML and RML,otherwise, CTA  and normal with respiratory effort.  Cardiovascular: Heart with regular rate and rhythm. No murmurs, gallops or rubs  Extremities: No LE edema. Pulses 2+ and symmetric.   Abdomen: Abdomen is soft, non-tender non-distended with normal bowel sounds.  Skin: Skin color, texture, turgor normal. No rashes.  Musculoskeletal: Normal gait.   Lymph Nodes: No cervical or supraclavicular adenopathy.  Neurologic: Normal strength and tone. No focal numbness or weakness.   Psychiatric: Not depressed.      Laboratory  Lab Results   Component Value Date    WBC 8.33 07/12/2019    HGB 14.7 07/12/2019    HCT 44.3 07/12/2019     07/12/2019    CHOL 226 (H) 07/12/2019    TRIG 72 07/12/2019    HDL 67 07/12/2019    ALT 15 07/12/2019    AST 20 07/12/2019     07/12/2019    K 4.4  07/12/2019     07/12/2019    CREATININE 0.9 07/12/2019    BUN 8 07/12/2019    CO2 27 07/12/2019    TSH 1.302 07/12/2019    PSA 0.40 07/12/2019    HGBA1C 5.3 07/12/2019         ASSESSMENT & PLAN:     Here for  visit.  Est'd with Dr. Thakkar in 07/2019.       Acute on Chronic Cough in the setting of chronic seasonal allergies and Rhinitis, with suspected recurrent Bronchitis / chronic Sinusitis:   -     Ambulatory Referral to Pulmonology  -     doxycycline (VIBRA-TABS) 100 MG tablet; Take 1 tablet (100 mg total) by mouth 2 (two) times daily. for 7 days  Dispense: 14 tablet; Refill: 0  -     guaifenesin-codeine 100-10 mg/5 ml (TUSSI-ORGANIDIN NR)  mg/5 mL syrup; Take 10 mLs by mouth every 8 (eight) hours as needed for Cough.  Dispense: 210 mL; Refill: 0  -     predniSONE (DELTASONE) 20 MG tablet; Take 2 tablets (40 mg total) by mouth once daily. for 5 days  Dispense: 10 tablet; Refill: 0    Chronic Insomnia:    ` trial Vistaril   -     hydrOXYzine pamoate (VISTARIL) 50 MG Cap; Take  1/2 tab nightly PRN  Dispense: 30 capsule; Refill: 0      *Follow up with Dr. Thakkar has been advised.       Future Appointments   Date Time Provider Department Center   10/24/2019  1:00 PM Kimber[facility]NP [facility]   10/24/2019  3:30 PM BRANDO Rodriguez [facility]   12/18/2019  2:00 PM Jorden Thakkar MD [facility]        Medication List           Accurate as of October 24, 2019 12:26 PM. If you have any questions, ask your nurse or doctor.               START taking these medications    doxycycline 100 MG tablet  Commonly known as:  VIBRA-TABS  Take 1 tablet (100 mg total) by mouth 2 (two) times daily. for 7 days  Started by:  BRANDO Waldron     guaifenesin-codeine 100-10 mg/5 ml  mg/5 mL syrup  Commonly known as:  TUSSI-ORGANIDIN NR  Take 10 mLs by mouth every 8 (eight) hours as needed for Cough.  Started by:  Kandy Shelton, FNP     hydrOXYzine pamoate 50  MG Cap  Commonly known as:  VISTARIL  Take  1/2 tab nightly PRN  Started by:  BRANDO Waldron     predniSONE 20 MG tablet  Commonly known as:  DELTASONE  Take 2 tablets (40 mg total) by mouth once daily. for 5 days  Started by:  BRANDO Waldron        CONTINUE taking these medications    ascorbic acid (vitamin C) 1000 MG tablet  Commonly known as:  VITAMIN C  Take 2 tablets (2,000 mg total) by mouth once daily.     buPROPion 150 MG TB24 tablet  Commonly known as:  WELLBUTRIN XL  Take 1 tablet (150 mg total) by mouth every evening.     cholecalciferol (vitamin D3) 2,000 unit Cap  Commonly known as:  VITAMIN D3  Take 1 capsule (2,000 Units total) by mouth once daily.     fluticasone propionate 50 mcg/actuation nasal spray  Commonly known as:  FLONASE  1 spray (50 mcg total) by Each Nare route once daily. Take during Spring and Fall.     loratadine 10 mg tablet  Commonly known as:  CLARITIN  Take 1 tablet (10 mg total) by mouth daily as needed for Allergies.           Where to Get Your Medications      These medications were sent to [facility]/pharmacy #[id number] - [place] [address] [facility]  [address] [address]    Phone:  [phone]   · doxycycline 100 MG tablet  · guaifenesin-codeine 100-10 mg/5 ml  mg/5 mL syrup  · hydrOXYzine pamoate 50 MG Cap  · predniSONE 20 MG tablet         Signing Physician:  BRANDO Waldron

## 2019-10-24 NOTE — PROGRESS NOTES
Subjective:       Patient ID: Gayle Cline is a 43 y.o. male.    Chief Complaint: Cough    HPI:   Gayle Cline is a 43 y.o. male who presents for evaluation of a cough.  He just a bit ago was evaluated and treated for his cough.  He re[ports that he doesn't cough much but does get bronchitis often.  Gets colds, sinus infections and has severe allergies.  Had pneumonia in 2003 and has had bronchitis a couple of times since.  No lung problems as a child.  Light cigar smoker, maybe 5 a year.  No lung problems in family.      Review of Systems   Constitutional: Positive for fatigue. Negative for chills, activity change and night sweats.   HENT: Positive for postnasal drip and congestion. Negative for rhinorrhea and trouble swallowing.    Eyes: Negative for itching.   Respiratory: Positive for cough, hemoptysis (in the past ) and chest tightness. Negative for sputum production, choking, shortness of breath, wheezing, dyspnea on extertion and use of rescue inhaler.    Cardiovascular: Negative for chest pain and palpitations.   Genitourinary: Negative for difficulty urinating.   Endocrine: Negative for cold intolerance and heat intolerance.    Musculoskeletal: Negative for arthralgias.   Skin: Negative for rash.   Gastrointestinal: Negative for nausea, vomiting and acid reflux.   Neurological: Negative for dizziness and light-headedness.   Hematological: Negative for adenopathy.   Psychiatric/Behavioral: Negative for sleep disturbance.         Social History     Tobacco Use    Smoking status: Never Smoker    Smokeless tobacco: Never Used   Substance Use Topics    Alcohol use: No     Frequency: Never     Drinks per session: Patient refused     Binge frequency: Never       Review of patient's allergies indicates:  No Known Allergies  Past Medical History:   Diagnosis Date    Chronic allergic rhinitis     Gastroesophageal reflux disease without esophagitis 10/18/2016    HPV in male     Rectal. Follow by  Colonrectal     Vitamin D insufficiency 7/12/2019     Past Surgical History:   Procedure Laterality Date    CHOLECYSTECTOMY  2012    Outside facilty     Current Outpatient Medications on File Prior to Visit   Medication Sig    ascorbic acid, vitamin C, (VITAMIN C) 1000 MG tablet Take 2 tablets (2,000 mg total) by mouth once daily.    buPROPion (WELLBUTRIN XL) 150 MG TB24 tablet Take 1 tablet (150 mg total) by mouth every evening.    cholecalciferol, vitamin D3, (VITAMIN D3) 2,000 unit Cap Take 1 capsule (2,000 Units total) by mouth once daily.    doxycycline (VIBRA-TABS) 100 MG tablet Take 1 tablet (100 mg total) by mouth 2 (two) times daily. for 7 days    fluticasone propionate (FLONASE) 50 mcg/actuation nasal spray 1 spray (50 mcg total) by Each Nare route once daily. Take during Spring and Fall.    guaifenesin-codeine 100-10 mg/5 ml (TUSSI-ORGANIDIN NR)  mg/5 mL syrup Take 10 mLs by mouth every 8 (eight) hours as needed for Cough.    hydrOXYzine pamoate (VISTARIL) 50 MG Cap Take  1/2 tab nightly PRN    loratadine (CLARITIN) 10 mg tablet Take 1 tablet (10 mg total) by mouth daily as needed for Allergies.    predniSONE (DELTASONE) 20 MG tablet Take 2 tablets (40 mg total) by mouth once daily. for 5 days     No current facility-administered medications on file prior to visit.        Objective:      Vitals:    10/24/19 1309   BP: 118/82   Pulse: 64     Physical Exam   Constitutional: He is oriented to person, place, and time. He appears well-developed and well-nourished. No distress.   HENT:   Head: Normocephalic.   Neck: Normal range of motion. Neck supple.   Cardiovascular: Normal rate.   Pulmonary/Chest: Normal expansion. No respiratory distress. He has no decreased breath sounds. He has no wheezes. He has no rales.   Abdominal: Soft.   Musculoskeletal: Normal range of motion. He exhibits no edema.   Lymphadenopathy:     He has no axillary adenopathy.   Neurological: He is alert and oriented to  person, place, and time. Gait normal.   Skin: Skin is warm and dry. He is not diaphoretic. No erythema. Nails show no clubbing.   Psychiatric: He has a normal mood and affect.   Nursing note and vitals reviewed.    Personal Diagnostic Review          Assessment:     Problem List Items Addressed This Visit        Pulmonary    Cough - Primary    Relevant Orders    Spirometry with/without bronchodilator    DLCO-Carbon Monoxide Diffusing Capacity    LUNG VOLUMES    X-Ray Chest PA And Lateral    Bronchitis    Overview     Has been started on doxi, cough syrup, flonase and steroids.  Has not initiated therapy yet.          Current Assessment & Plan     CXR when able- patient unable to do today as he must return to work.  Await PFTs.  Likely that he will need to see allergy as he endorses extensive seasonal allergy symptoms. Start treatment plan initiated by NP Kandy Pitts.  Will add albuterol PRN.

## 2019-10-24 NOTE — LETTER
October 25, 2019      Kandy Shelton, [facility]  [address]  [facility] [address]           [facility]  [address]  [facility] [address]  Phone: [phone]          Patient: Gayle Cline   MR Number: [id number]   YOB: 1975   Date of Visit: 10/24/2019       Dear Kandy Shelton:    Thank you for referring Gayle Cline to me for evaluation. Attached you will find relevant portions of my assessment and plan of care.    If you have questions, please do not hesitate to call me. I look forward to following Gayle Cline along with you.    Sincerely,    Kimberley Hewitt, DNP    Enclosure  CC:  No Recipients    If you would like to receive this communication electronically, please contact [email or web address] or [phone] to request more information on [facility] access.    For providers and/or their staff who would like to refer a patient to [facility], please contact us through our one-stop-shop provider referral line, [facility], at [phone].    If you feel you have received this communication in error or would no longer like to receive these types of communications, please e-mail [email or web address]

## 2019-10-25 PROBLEM — J40 BRONCHITIS: Status: ACTIVE | Noted: 2019-10-25

## 2019-10-25 NOTE — ASSESSMENT & PLAN NOTE
CXR when able- patient unable to do today as he must return to work.  Await PFTs.  Likely that he will need to see allergy as he endorses extensive seasonal allergy symptoms. Start treatment plan initiated by HARIS Pitts.  Will add albuterol PRN.

## 2019-10-30 ENCOUNTER — HOSPITAL ENCOUNTER (OUTPATIENT)
Dept: PULMONOLOGY | Facility: CLINIC | Age: 44
Discharge: HOME | End: 2019-10-30
Payer: COMMERCIAL

## 2019-10-30 ENCOUNTER — HOSPITAL ENCOUNTER (OUTPATIENT)
Dept: RADIOLOGY | Facility: HOSPITAL | Age: 44
Discharge: HOME | End: 2019-10-30
Attending: NURSE PRACTITIONER
Payer: COMMERCIAL

## 2019-10-30 DIAGNOSIS — R05.9 COUGH: ICD-10-CM

## 2019-10-30 LAB
DLCO ADJ PRE: 29.37 ML/(MIN*MMHG) (ref 24.18–38.04)
DLCO SINGLE BREATH LLN: 24.18
DLCO SINGLE BREATH PRE REF: 94.4 %
DLCO SINGLE BREATH REF: 31.11
DLCOC SBVA LLN: 3.32
DLCOC SBVA PRE REF: 107.4 %
DLCOC SBVA REF: 4.61
DLCOC SINGLE BREATH LLN: 24.18
DLCOC SINGLE BREATH PRE REF: 94.4 %
DLCOC SINGLE BREATH REF: 31.11
DLCOCSBVAULN: 5.91
DLCOCSINGLEBREATHULN: 38.04
DLCOSINGLEBREATHULN: 38.04
DLCOVA LLN: 3.32
DLCOVA PRE REF: 107.4 %
DLCOVA PRE: 4.96 ML/(MIN*MMHG*L) (ref 3.32–5.91)
DLCOVA REF: 4.61
DLCOVAULN: 5.91
DLVAADJ PRE: 4.96 ML/(MIN*MMHG*L) (ref 3.32–5.91)
ERVN2 LLN: 1.38
ERVN2 PRE REF: 80.7 %
ERVN2 PRE: 1.11 L (ref 1.38–1.38)
ERVN2 REF: 1.38
ERVN2ULN: 1.38
FEF 25 75 LLN: 2.17
FEF 25 75 PRE REF: 96.7 %
FEF 25 75 REF: 3.78
FEV05 LLN: 1.81
FEV05 REF: 2.94
FEV1 FVC LLN: 70
FEV1 FVC PRE REF: 99.7 %
FEV1 FVC REF: 81
FEV1 LLN: 3.08
FEV1 PRE REF: 95.8 %
FEV1 REF: 3.9
FRCN2 LLN: 2.35
FRCN2 PRE REF: 74.3 %
FRCN2 REF: 3.34
FRCN2ULN: 4.32
FVC LLN: 3.85
FVC PRE REF: 95.7 %
FVC REF: 4.85
IVC PRE: 4.39 L (ref 3.85–5.86)
IVC SINGLE BREATH LLN: 3.85
IVC SINGLE BREATH PRE REF: 90.4 %
IVC SINGLE BREATH REF: 4.85
IVCSINGLEBREATHULN: 5.86
PEF LLN: 7.51
PEF PRE REF: 113.3 %
PEF REF: 9.7
PRE DLCO: 29.37 ML/(MIN*MMHG) (ref 24.18–38.04)
PRE FEF 25 75: 3.66 L/S (ref 2.17–5.4)
PRE FET 100: 7.06 SEC
PRE FEV05 REF: 101 %
PRE FEV1 FVC: 80.35 % (ref 70.04–91.12)
PRE FEV1: 3.73 L (ref 3.08–4.71)
PRE FEV5: 2.97 L (ref 1.81–4.08)
PRE FRC N2: 2.48 L
PRE FVC: 4.65 L (ref 3.85–5.86)
PRE PEF: 10.99 L/S (ref 7.51–11.9)
RVN2 LLN: 1.29
RVN2 PRE REF: 69.9 %
RVN2 PRE: 1.37 L (ref 1.29–2.63)
RVN2 REF: 1.96
RVN2TLCN2 LLN: 21.36
RVN2TLCN2 PRE REF: 75 %
RVN2TLCN2 PRE: 22.76 % (ref 21.36–39.32)
RVN2TLCN2 REF: 30.34
RVN2TLCN2ULN: 39.32
RVN2ULN: 2.63
TLCN2 LLN: 5.59
TLCN2 PRE REF: 89.3 %
TLCN2 PRE: 6.02 L (ref 5.59–7.89)
TLCN2 REF: 6.74
TLCN2ULN: 7.89
VA PRE: 5.95 L (ref 6.59–6.59)
VA SINGLE BREATH LLN: 6.59
VA SINGLE BREATH PRE REF: 90.2 %
VA SINGLE BREATH REF: 6.59
VASINGLEBREATHULN: 6.59
VCMAXN2 LLN: 3.85
VCMAXN2 PRE REF: 95.8 %
VCMAXN2 PRE: 4.65 L (ref 3.85–5.86)
VCMAXN2 REF: 4.85
VCMAXN2ULN: 5.86

## 2019-10-30 PROCEDURE — 94010 BREATHING CAPACITY TEST: CPT | Mod: S$GLB,,, | Performed by: INTERNAL MEDICINE

## 2019-10-30 PROCEDURE — 71046 X-RAY EXAM CHEST 2 VIEWS: CPT | Mod: 26,,, | Performed by: RADIOLOGY

## 2019-10-30 PROCEDURE — 94727 PR PULM FUNCTION TEST BY GAS: ICD-10-PCS | Mod: S$GLB,,, | Performed by: INTERNAL MEDICINE

## 2019-10-30 PROCEDURE — 94729 DIFFUSING CAPACITY: CPT | Mod: S$GLB,,, | Performed by: INTERNAL MEDICINE

## 2019-10-30 PROCEDURE — 94727 GAS DIL/WSHOT DETER LNG VOL: CPT | Mod: S$GLB,,, | Performed by: INTERNAL MEDICINE

## 2019-10-30 PROCEDURE — 71046 X-RAY EXAM CHEST 2 VIEWS: CPT | Mod: TC,FY

## 2019-10-30 PROCEDURE — 94010 BREATHING CAPACITY TEST: ICD-10-PCS | Mod: S$GLB,,, | Performed by: INTERNAL MEDICINE

## 2019-10-30 PROCEDURE — 71046 XR CHEST PA AND LATERAL: ICD-10-PCS | Mod: 26,,, | Performed by: RADIOLOGY

## 2019-10-30 PROCEDURE — 94729 PR C02/MEMBANE DIFFUSE CAPACITY: ICD-10-PCS | Mod: S$GLB,,, | Performed by: INTERNAL MEDICINE

## 2019-11-08 ENCOUNTER — CLINICAL SUPPORT (OUTPATIENT)
Dept: INTERNAL MEDICINE | Facility: CLINIC | Age: 44
End: 2019-11-08
Payer: COMMERCIAL

## 2019-11-08 ENCOUNTER — OFFICE VISIT (OUTPATIENT)
Dept: INTERNAL MEDICINE | Facility: CLINIC | Age: 44
End: 2019-11-08
Payer: COMMERCIAL

## 2019-11-08 VITALS
OXYGEN SATURATION: 98 % | HEART RATE: 79 BPM | DIASTOLIC BLOOD PRESSURE: 80 MMHG | WEIGHT: 174.38 LBS | BODY MASS INDEX: 25.83 KG/M2 | HEIGHT: 69 IN | TEMPERATURE: 97 F | SYSTOLIC BLOOD PRESSURE: 110 MMHG

## 2019-11-08 DIAGNOSIS — R05.8 ALLERGIC COUGH: ICD-10-CM

## 2019-11-08 DIAGNOSIS — F90.0 ADHD (ATTENTION DEFICIT HYPERACTIVITY DISORDER), INATTENTIVE TYPE: ICD-10-CM

## 2019-11-08 DIAGNOSIS — E55.9 VITAMIN D INSUFFICIENCY: ICD-10-CM

## 2019-11-08 DIAGNOSIS — J30.9 CHRONIC ALLERGIC RHINITIS: Primary | ICD-10-CM

## 2019-11-08 PROCEDURE — 3008F PR BODY MASS INDEX (BMI) DOCUMENTED: ICD-10-PCS | Mod: CPTII,S$GLB,, | Performed by: INTERNAL MEDICINE

## 2019-11-08 PROCEDURE — 90471 IMMUNIZATION ADMIN: CPT | Mod: S$GLB,,, | Performed by: INTERNAL MEDICINE

## 2019-11-08 PROCEDURE — 99999 PR PBB SHADOW E&M-EST. PATIENT-LVL I: ICD-10-PCS | Mod: PBBFAC,,,

## 2019-11-08 PROCEDURE — 99999 PR PBB SHADOW E&M-EST. PATIENT-LVL I: CPT | Mod: PBBFAC,,,

## 2019-11-08 PROCEDURE — 90715 TDAP VACCINE GREATER THAN OR EQUAL TO 7YO IM: ICD-10-PCS | Mod: S$GLB,,, | Performed by: INTERNAL MEDICINE

## 2019-11-08 PROCEDURE — 99214 OFFICE O/P EST MOD 30 MIN: CPT | Mod: 25,S$GLB,, | Performed by: INTERNAL MEDICINE

## 2019-11-08 PROCEDURE — 99999 PR PBB SHADOW E&M-EST. PATIENT-LVL IV: CPT | Mod: PBBFAC,,, | Performed by: INTERNAL MEDICINE

## 2019-11-08 PROCEDURE — 90471 TDAP VACCINE GREATER THAN OR EQUAL TO 7YO IM: ICD-10-PCS | Mod: S$GLB,,, | Performed by: INTERNAL MEDICINE

## 2019-11-08 PROCEDURE — 90715 TDAP VACCINE 7 YRS/> IM: CPT | Mod: S$GLB,,, | Performed by: INTERNAL MEDICINE

## 2019-11-08 PROCEDURE — 99214 PR OFFICE/OUTPT VISIT, EST, LEVL IV, 30-39 MIN: ICD-10-PCS | Mod: 25,S$GLB,, | Performed by: INTERNAL MEDICINE

## 2019-11-08 PROCEDURE — 99999 PR PBB SHADOW E&M-EST. PATIENT-LVL IV: ICD-10-PCS | Mod: PBBFAC,,, | Performed by: INTERNAL MEDICINE

## 2019-11-08 PROCEDURE — 3008F BODY MASS INDEX DOCD: CPT | Mod: CPTII,S$GLB,, | Performed by: INTERNAL MEDICINE

## 2019-11-08 RX ORDER — MONTELUKAST SODIUM 10 MG/1
10 TABLET ORAL NIGHTLY
Qty: 30 TABLET | Refills: 11 | Status: SHIPPED | OUTPATIENT
Start: 2019-11-08 | End: 2019-11-08 | Stop reason: SDUPTHER

## 2019-11-08 RX ORDER — ATOMOXETINE 40 MG/1
40 CAPSULE ORAL DAILY
Qty: 60 CAPSULE | Refills: 2 | Status: SHIPPED | OUTPATIENT
Start: 2019-11-08 | End: 2022-05-13

## 2019-11-08 RX ORDER — ATOMOXETINE 40 MG/1
40 CAPSULE ORAL DAILY
Qty: 60 CAPSULE | Refills: 2 | Status: SHIPPED | OUTPATIENT
Start: 2019-11-08 | End: 2019-11-08 | Stop reason: SDUPTHER

## 2019-11-08 RX ORDER — IBUPROFEN 100 MG/5ML
1000 SUSPENSION, ORAL (FINAL DOSE FORM) ORAL DAILY
COMMUNITY
Start: 2019-11-08

## 2019-11-08 RX ORDER — MONTELUKAST SODIUM 10 MG/1
10 TABLET ORAL NIGHTLY
Qty: 30 TABLET | Refills: 11 | Status: SHIPPED | OUTPATIENT
Start: 2019-11-08 | End: 2022-05-13

## 2019-11-08 NOTE — PROGRESS NOTES
INTERNAL MEDICINE CLINIC  Follow-up Visit Progress Note     PRESENTING HISTORY     PCP: Jorden Thakkar MD    Last Clinic Visit with me:  7-    Current Chief Complaint/Problem:    Chief Complaint   Patient presents with    Follow-up     History of Present Illness & ROS: Mr. Gayle Cline is a 43 y.o. male.    Saw Pulmonary  10-24-19  - Normal CXR. Normal PFT.    Mild cough again.   Sinus congestion.    Review of Systems:  Answers for HPI/ROS submitted by the patient on 11/6/2019   activity change: No  unexpected weight change: No  neck pain: No  hearing loss: No  rhinorrhea: No  trouble swallowing: No  eye discharge: No  visual disturbance: No  chest tightness: No  wheezing: No  chest pain: No  palpitations: No  blood in stool: No  constipation: No  vomiting: No  diarrhea: No  polydipsia: No  polyuria: No  difficulty urinating: No  urgency: No  hematuria: No  joint swelling: No  arthralgias: No  headaches: No  weakness: No  confusion: No  dysphoric mood: No      PAST HISTORY:     Past Medical History:   Diagnosis Date    Chronic allergic rhinitis     Gastroesophageal reflux disease without esophagitis 10/18/2016    HPV in male     Rectal. Follow by Colonrectal     Vitamin D insufficiency 7/12/2019       Past Surgical History:   Procedure Laterality Date    CHOLECYSTECTOMY  2012    Outside facilty       Family History   Problem Relation Age of Onset    Diabetes Father 60    Obesity Father     Stroke Maternal Grandfather     Heart disease Paternal Grandfather 60    Heart disease Maternal Uncle 40        CABG       Social History     Socioeconomic History    Marital status:      Spouse name: Magnolia    Number of children: Not on file    Years of education: Not on file    Highest education level: Not on file   Occupational History    Not on file   Social Needs    Financial resource strain: Not hard at all    Food insecurity:     Worry: Never true     Inability: Never true     Transportation needs:     Medical: No     Non-medical: No   Tobacco Use    Smoking status: Never Smoker    Smokeless tobacco: Never Used   Substance and Sexual Activity    Alcohol use: No     Frequency: Never     Drinks per session: Patient refused     Binge frequency: Never    Drug use: No    Sexual activity: Yes     Partners: Female   Lifestyle    Physical activity:     Days per week: 3 days     Minutes per session: 10 min    Stress: Only a little   Relationships    Social connections:     Talks on phone: More than three times a week     Gets together: Once a week     Attends Sabianism service: Not on file     Active member of club or organization: No     Attends meetings of clubs or organizations: Never     Relationship status:    Other Topics Concern    Not on file   Social History Narrative    : Magnolia Xavier    One step son     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 90 capsule 3    fluticasone propionate (FLONASE) 50 mcg/actuation nasal spray 1 spray (50 mcg total) by Each Nare route once daily. Take during Spring and Fall. 3 Bottle 3    loratadine (CLARITIN) 10 mg tablet Take 1 tablet (10 mg total) by mouth daily as needed for Allergies.  0           ascorbic acid, vitamin C, (VITAMIN C) 1000 MG tablet Take 2 tablets (2,000 mg total) by mouth once daily.         Review of patient's allergies indicates:  No Known Allergies    Medications Reconciliation:   I have reconciled the patient's home medications and discharge medications with the patient/family. I have updated all changes.  Refer to After-Visit Medication List.    OBJECTIVE:     Vital Signs:  Vitals:    11/08/19 1555   BP: 110/80   Pulse: 79   Temp: 97.3 °F (36.3 °C)     Wt Readings from  Last 1 Encounters:   11/08/19 1555 79.1 kg (174 lb 6.1 oz)     Body mass index is 25.75 kg/m².     Physical Exam:  General: Well developed, well nourished. No distress.  HEENT: Head is normocephalic, atraumatic. Mouth is clear.  Eyes: Clear conjunctiva.  Neck: Supple, symmetrical neck; trachea midline.  Lungs: Clear to auscultation bilaterally and normal respiratory effort.  Cardiovascular: Heart with regular rate and rhythm.    Extremities: No LE edema.    Abdomen: Abdomen is soft, non-tender non-distended with normal bowel sounds.  Skin: Skin color, texture, turgor normal. No rashes.  Psychiatric: Normal affect. Alert.    Laboratory  Lab Results   Component Value Date    WBC 8.33 07/12/2019    HGB 14.7 07/12/2019    HCT 44.3 07/12/2019     07/12/2019    CHOL 226 (H) 07/12/2019    TRIG 72 07/12/2019    HDL 67 07/12/2019    ALT 15 07/12/2019    AST 20 07/12/2019     07/12/2019    K 4.4 07/12/2019     07/12/2019    CREATININE 0.9 07/12/2019    BUN 8 07/12/2019    CO2 27 07/12/2019    TSH 1.302 07/12/2019    PSA 0.40 07/12/2019    HGBA1C 5.3 07/12/2019       ASSESSMENT & PLAN:     Chronic allergic rhinitis  Allergic cough  - Normal Pulmonary Workup (normal CXR and PFT).    No GERD.  - Plan:  Need Allergy and ENT evaluation.    -     ascorbic acid, vitamin C, (VITAMIN C) 1000 MG tablet; Take 1 tablet (1,000 mg total) by mouth once daily.  -     Ambulatory consult to ENT  -     Ambulatory consult to Allergy  -     montelukast (SINGULAIR) 10 mg tablet; Take 1 tablet (10 mg total) by mouth every evening.  Dispense: 30 tablet; Refill: 11    ADHD (attention deficit hyperactivity disorder), inattentive type  - Nortriptyline and Wellbutrin did not work.    Plan: Trial of Strattera 40-80 mg daily.  -     atomoxetine (STRATTERA) 40 MG capsule; Take 1 capsule (40 mg total) by mouth once daily.  Dispense: 60 capsule; Refill: 2    Vitamin D insufficiency  - On Vitamin D replacement 2000 IU daily.    Preventive  Health Maintenance:  Tdap and flu Rx given to the patient.    Return to Clinic for Follow Up with me:   December    Scheduled Follow-up :  Future Appointments   Date Time Provider Department Center   12/26/2019  1:30 PM Jorden Thakkar MD [facility] Nathan Esposito W       After Visit Medication List :     Medication List           Accurate as of November 8, 2019  4:30 PM. If you have any questions, ask your nurse or doctor.               START taking these medications    atomoxetine 40 MG capsule  Commonly known as:  STRATTERA  Take 1 capsule (40 mg total) by mouth once daily.  Started by:  Jorden Thakkar MD     montelukast 10 mg tablet  Commonly known as:  SINGULAIR  Take 1 tablet (10 mg total) by mouth every evening.  Started by:  Jorden Thakkar MD        CHANGE how you take these medications    ascorbic acid (vitamin C) 1000 MG tablet  Commonly known as:  VITAMIN C  Take 1 tablet (1,000 mg total) by mouth once daily.  What changed:  how much to take  Changed by:  Jorden Thakkar MD        CONTINUE taking these medications    cholecalciferol (vitamin D3) 2,000 unit Cap  Commonly known as:  VITAMIN D3  Take 1 capsule (2,000 Units total) by mouth once daily.     fluticasone propionate 50 mcg/actuation nasal spray  Commonly known as:  FLONASE  1 spray (50 mcg total) by Each Nare route once daily. Take during Spring and Fall.     loratadine 10 mg tablet  Commonly known as:  CLARITIN  Take 1 tablet (10 mg total) by mouth daily as needed for Allergies.        STOP taking these medications    albuterol 90 mcg/actuation inhaler  Commonly known as:  PROVENTIL HFA  Stopped by:  Jorden Thakkar MD     buPROPion 150 MG TB24 tablet  Commonly known as:  WELLBUTRIN XL  Stopped by:  Jorden Thakkar MD     hydrOXYzine pamoate 50 MG Cap  Commonly known as:  VISTARIL  Stopped by:  Jorden Thakkar MD           Where to Get Your Medications      These medications were sent to [facility]/pharmacy #[id number] - [place], [place] - [address]  [address],  [place] [address]    Phone:  [phone]   · atomoxetine 40 MG capsule  · montelukast 10 mg tablet     You can get these medications from any pharmacy    You don't need a prescription for these medications  · ascorbic acid (vitamin C) 1000 MG tablet         Signing Physician:  Jorden Thakkar MD

## 2019-11-09 ENCOUNTER — IMMUNIZATION (OUTPATIENT)
Dept: PHARMACY | Facility: CLINIC | Age: 44
End: 2019-11-09
Payer: COMMERCIAL

## 2019-11-15 ENCOUNTER — OFFICE VISIT (OUTPATIENT)
Dept: OTOLARYNGOLOGY | Facility: CLINIC | Age: 44
End: 2019-11-15
Payer: COMMERCIAL

## 2019-11-15 DIAGNOSIS — J30.9 CHRONIC ALLERGIC RHINITIS: ICD-10-CM

## 2019-11-15 DIAGNOSIS — J31.0 CHRONIC RHINITIS: Primary | ICD-10-CM

## 2019-11-15 PROCEDURE — 99999 PR PBB SHADOW E&M-EST. PATIENT-LVL II: ICD-10-PCS | Mod: PBBFAC,,, | Performed by: NURSE PRACTITIONER

## 2019-11-15 PROCEDURE — 99202 PR OFFICE/OUTPT VISIT, NEW, LEVL II, 15-29 MIN: ICD-10-PCS | Mod: S$GLB,,, | Performed by: NURSE PRACTITIONER

## 2019-11-15 PROCEDURE — 99202 OFFICE O/P NEW SF 15 MIN: CPT | Mod: S$GLB,,, | Performed by: NURSE PRACTITIONER

## 2019-11-15 PROCEDURE — 99999 PR PBB SHADOW E&M-EST. PATIENT-LVL II: CPT | Mod: PBBFAC,,, | Performed by: NURSE PRACTITIONER

## 2019-11-15 RX ORDER — HYDROXYZINE PAMOATE 25 MG/1
CAPSULE ORAL
Qty: 30 CAPSULE | Refills: 1 | OUTPATIENT
Start: 2019-11-15

## 2019-11-15 NOTE — LETTER
November 15, 2019      Jorden Thakkar MD  [address] [facility] [address]  [place] [address]           Nathan shaq - Otorhinolaryngology  [address]  [facility] [address]  Phone: [phone]  Fax: [phone]          Patient: Gayle Cline   MR Number: [id number]   YOB: 1975   Date of Visit: 11/15/2019       Dear Dr. Jorden Thakkar:    Thank you for referring Gayle Cline to me for evaluation. Attached you will find relevant portions of my assessment and plan of care.    If you have questions, please do not hesitate to call me. I look forward to following Gayle Cline along with you.    Sincerely,    Kasey Nielsen, NP    Enclosure  CC:  No Recipients    If you would like to receive this communication electronically, please contact [email or web address] or [phone] to request more information on [employer] Link access.    For providers and/or their staff who would like to refer a patient to [facility], please contact us through our one-stop-shop provider referral line, [facility], at [phone].    If you feel you have received this communication in error or would no longer like to receive these types of communications, please e-mail [email or web address]

## 2019-11-15 NOTE — PROGRESS NOTES
Subjective:      Gayle Cline is a 43 y.o. male who was referred to me by Dr. Jorden Thakkar in consultation for sinusitis.    Mr. Cline presents with persistent rhinorrhea with associated nasal congestion, post-nasal drip, and intermittent frontal sinus pressure for the past several years. He has tried fluticasone and Claritin with limited benefit. He states he had recently started oral Singulair with some improvement of symptoms. He reports his symptoms are better today than the weeks past. He reports that he has an appointment with allergy clinic next week. He states he has been treated for several sinus infection with antibiotics. Last antibiotic was completed 3 weeks ago with benefit.     Current sinonasal medications as above.  He does not regularly use nasal decongestant sprays.    He does not recall previously having allergy testing.    He denies a history of asthma.    He denies a history of reflux symptoms.  He has not previously had an EGD.    He denies have a diagnosis of obstructive sleep apnea.     He has not had sinonasal surgery.    He does not recall a prior history of nasal trauma.      Past Medical History  He has a past medical history of Chronic allergic rhinitis, Gastroesophageal reflux disease without esophagitis, HPV in male, and Vitamin D insufficiency.    Past Surgical History  He has a past surgical history that includes Cholecystectomy (2012).    Family History  His family history includes Diabetes (age of onset: 60) in his father; Heart disease (age of onset: 40) in his maternal uncle; Heart disease (age of onset: 60) in his paternal grandfather; Obesity in his father; Stroke in his maternal grandfather.    Social History  He reports that he has never smoked. He has never used smokeless tobacco. He reports that he does not drink alcohol or use drugs.    Allergies  He has No Known Allergies.    Medications   He has a current medication list which includes the following  prescription(s): ascorbic acid (vitamin c), atomoxetine, cholecalciferol (vitamin d3), fluticasone propionate, montelukast, and loratadine.    Review of Systems  Review of Systems   Constitutional: Negative for chills, fatigue and fever.   HENT: Positive for congestion, postnasal drip, rhinorrhea and sinus pressure. Negative for ear discharge, ear pain, facial swelling, nosebleeds, sinus pain, sneezing, sore throat and tinnitus.    Eyes: Positive for itching. Negative for photophobia, redness and visual disturbance.   Respiratory: Positive for cough. Negative for apnea, shortness of breath, wheezing and stridor.    Cardiovascular: Negative for chest pain and palpitations.   Gastrointestinal: Negative for diarrhea, nausea and vomiting.   Endocrine: Negative.    Genitourinary: Negative for decreased urine volume, dysuria and frequency.   Musculoskeletal: Negative for arthralgias, myalgias and neck stiffness.   Skin: Negative for rash and wound.   Allergic/Immunologic: Positive for environmental allergies. Negative for food allergies and immunocompromised state.   Neurological: Negative for dizziness, syncope, weakness, light-headedness and headaches.   Hematological: Negative for adenopathy. Does not bruise/bleed easily.   Psychiatric/Behavioral: Negative for confusion, decreased concentration and sleep disturbance.          Objective:     There were no vitals taken for this visit.       Constitutional:   He is oriented to person, place, and time. Vital signs are normal. He appears well-developed and well-nourished. He appears alert. Normal speech.      Head:  Normocephalic and atraumatic.     Ears:    Right Ear: No lacerations. No drainage, swelling or tenderness. No foreign bodies. No mastoid tenderness. Tympanic membrane is not injected, not scarred, not perforated, not erythematous, not retracted and not bulging. Tympanic membrane mobility is normal. No middle ear effusion. No hemotympanum.   Left Ear: No  lacerations. No drainage, swelling or tenderness. No foreign bodies. No mastoid tenderness. Tympanic membrane is not injected, not scarred, not perforated, not erythematous, not retracted and not bulging. Tympanic membrane mobility is normal.  No middle ear effusion. No hemotympanum.     Nose:  Mucosal edema and rhinorrhea present. No nose lacerations, sinus tenderness, septal deviation, nasal septal hematoma or polyps. No epistaxis.  No foreign bodies. No turbinate hypertrophy.  Right sinus exhibits no maxillary sinus tenderness and no frontal sinus tenderness. Left sinus exhibits no maxillary sinus tenderness and no frontal sinus tenderness.     Mouth/Throat  Oropharynx clear and moist without lesions or asymmetry, normal uvula midline and lips, teeth, and gums normal. No uvula swelling, oral lesions, trismus, mucous membrane lesions or xerostomia. No oropharyngeal exudate or posterior oropharyngeal erythema.     Neck:  Neck normal without thyromegaly masses, asymmetry, normal tracheal structure, crepitus, and tenderness and no adenopathy.     Psychiatric:   He has a normal mood and affect. His speech is normal and behavior is normal.     Neurological:   He is alert and oriented to person, place, and time. No cranial nerve deficit.     Skin:   No abrasions, lacerations, lesions, or rashes.       Procedure    None      Data Reviewed    WBC (K/uL)   Date Value   07/12/2019 8.33     Eosinophil% (%)   Date Value   07/12/2019 1.8     Eos # (K/uL)   Date Value   07/12/2019 0.2     Platelets (K/uL)   Date Value   07/12/2019 274     Glucose (mg/dL)   Date Value   07/12/2019 94     No results found for: IGE     No sinus imaging available.       Assessment:     1. Chronic rhinitis    2. Chronic allergic rhinitis         Plan:     I had a long discussion with the patient regarding his condition and the further workup and management options.    Patient has appointment with Allergy next week. I recommend he keep  appointment.  Continue fluticasone and Claritin for now.   Continue Singulair for now.   I recommend adding nasal saline rinses daily with distilled water as instructed.    Follow up if symptoms worsen or fail to improve.

## 2019-11-21 ENCOUNTER — OFFICE VISIT (OUTPATIENT)
Dept: ALLERGY | Facility: CLINIC | Age: 44
End: 2019-11-21
Payer: COMMERCIAL

## 2019-11-21 VITALS
WEIGHT: 178.38 LBS | HEIGHT: 69 IN | SYSTOLIC BLOOD PRESSURE: 126 MMHG | BODY MASS INDEX: 26.42 KG/M2 | DIASTOLIC BLOOD PRESSURE: 82 MMHG

## 2019-11-21 DIAGNOSIS — J32.9 RECURRENT SINUSITIS: Primary | ICD-10-CM

## 2019-11-21 PROCEDURE — 99244 OFF/OP CNSLTJ NEW/EST MOD 40: CPT | Mod: S$GLB,,, | Performed by: ALLERGY & IMMUNOLOGY

## 2019-11-21 PROCEDURE — 99999 PR PBB SHADOW E&M-EST. PATIENT-LVL III: CPT | Mod: PBBFAC,,, | Performed by: ALLERGY & IMMUNOLOGY

## 2019-11-21 PROCEDURE — 99244 PR OFFICE CONSULTATION,LEVEL IV: ICD-10-PCS | Mod: S$GLB,,, | Performed by: ALLERGY & IMMUNOLOGY

## 2019-11-21 PROCEDURE — 99999 PR PBB SHADOW E&M-EST. PATIENT-LVL III: ICD-10-PCS | Mod: PBBFAC,,, | Performed by: ALLERGY & IMMUNOLOGY

## 2019-11-21 NOTE — PROGRESS NOTES
Subjective:       Patient ID: Gayle Cline is a 43 y.o. male.    Chief Complaint:  Other (Recurrent sinus infections, Bronchitis)      HPI:   Patient's symptoms include nasal congestion, postnasal drip, sinus pressure and fatigue. These symptoms are perennial. Maybe worse in fall and spring. Current triggers include exposure to no known precipitant. The patient has been suffering from these symptoms for approximately since high school. . The patient has tried flonase, claritin, singulair, nasal saline rinses with unsatisfactory relief of symptoms. Recent addition of routine saline rinses seems helpful. Immunotherapy has never been tried. The patient has never had nasal polyps. The patient has no history of asthma. The patient has a history of eczema. limited to hands, sometimes feet. Controlled w aggressive moisturization. The patient takes antibiotics for sinusitis 2 times per year. But feels likes gets sinus infections at least 3-4 times per year. Can't always tell that abx are helpful. Relief w abx is temporary. The patient has not had sinus surgery in the past.   No recurrent OM. Recalls hx of pneumonia 1-2 episodes, and more frequently, bronchitis    Environmental History: Pets in the home: dogs (2).  Yudy: hardwood floors  Tobacco Smoke in Home: no    Past Medical History:   Diagnosis Date    Chronic allergic rhinitis          HPV in male     Rectal. Follow by Colonrectal     Recurrent upper respiratory infection (URI)     Vitamin D insufficiency 7/12/2019         Family History   Problem Relation Age of Onset    Diabetes Father 60    Obesity Father     Stroke Maternal Grandfather     Heart disease Paternal Grandfather 60    Heart disease Maternal Uncle 40        CABG   mother w recurrent sinusitis, negative allergy testing  Dad  w eczema    Review of Systems   Constitutional: Negative for activity change, fatigue and fever.   HENT: Negative for congestion, postnasal drip, rhinorrhea, sinus  pressure and sneezing.    Eyes: Negative for discharge, redness and itching.   Respiratory: Positive for cough (mild). Negative for shortness of breath and wheezing.    Cardiovascular: Negative for chest pain.   Gastrointestinal: Negative for constipation, diarrhea, nausea and vomiting.   Genitourinary: Negative for difficulty urinating.   Musculoskeletal: Negative for joint swelling and myalgias.   Skin: Negative for rash.   Neurological: Negative for headaches.   Hematological: Does not bruise/bleed easily.   Psychiatric/Behavioral: Negative for behavioral problems and sleep disturbance.          Objective:   Physical Exam   Constitutional: He is oriented to person, place, and time. He appears well-developed and well-nourished. No distress.   HENT:   Head: Normocephalic and atraumatic.   Right Ear: Tympanic membrane and external ear normal.   Left Ear: Tympanic membrane and external ear normal.   Nose: Nose normal.   Mouth/Throat: Oropharynx is clear and moist. No oropharyngeal exudate.   Eyes: Conjunctivae are normal. Right eye exhibits no discharge. Left eye exhibits no discharge.   Neck: Normal range of motion. Neck supple. No thyromegaly present.   Cardiovascular: Normal rate and regular rhythm.   Pulmonary/Chest: Effort normal and breath sounds normal. No respiratory distress. He has no wheezes.   Abdominal: Soft. Bowel sounds are normal. He exhibits no distension. There is no tenderness.   Musculoskeletal: Normal range of motion. He exhibits no edema.   Lymphadenopathy:     He has no cervical adenopathy.   Neurological: He is alert and oriented to person, place, and time. He exhibits normal muscle tone.   Skin: Skin is warm. No rash noted. He is not diaphoretic. No erythema.   Psychiatric: He has a normal mood and affect. His behavior is normal. Judgment and thought content normal.           Assessment:       1. Recurrent sinusitis         Plan:       Gayle was seen today for other.    Diagnoses and all  orders for this visit:    Recurrent sinusitis  -     S.pneumoniae IgG Serotypes; Future  -     Immunoglobulins (IgG, IgA, IgM) Quantitative; Future  -     IgE; Future  -     Cat epithelium IgE; Future  -     Dog dander IgE; Future  -     D. farinae IgE; Future  -     D. pteronyssinus IgE; Future  -     Aspergillus fumagatus IgE; Future  -     Allergen-Alternaria Alternata; Future  -     Cockroach, American IgE; Future  -     Bahia grass IgE; Future  -     Ronen IgE; Future  -     Oak, white IgE; Future  -     Allergen-Cedar; Future  -     Allergen, Pecan Tree IgE; Future  -     Ragweed, short, common IgE; Future  -     Marsh elder, rough IgE; Future  -     Plantain, English IgE; Future    cont curent med regimen, incl liberal use nasal saline rinses  If neg immunoCAP, consider stop singulair  Discussed possibility of low pneumo titers, poss Pneumovax challenge

## 2019-11-21 NOTE — LETTER
November 21, 2019      Jorden Thakkar MD  [address]  [place] [address]           Nathan Mcdonald - Allergy/ Immunology  [address]  [facility] [address]  Phone: [phone]  Fax: [phone]          Patient: Gayle Cline   MR Number: [id number]   YOB: 1975   Date of Visit: 11/21/2019       Dear Dr. Jorden Thakkar:    Thank you for referring Gayle Cline to me for evaluation. Attached you will find relevant portions of my assessment and plan of care.    If you have questions, please do not hesitate to call me. I look forward to following Gayle Cline along with you.    Sincerely,    Pepito Santiago MD    Enclosure  CC:  No Recipients    If you would like to receive this communication electronically, please contact [email or web address] or [phone] to request more information on [employer] Link access.    For providers and/or their staff who would like to refer a patient to [facility], please contact us through our one-stop-shop provider referral line, [facility], at [phone].    If you feel you have received this communication in error or would no longer like to receive these types of communications, please e-mail [email or web address]

## 2019-12-10 ENCOUNTER — TELEPHONE (OUTPATIENT)
Dept: ALLERGY | Facility: CLINIC | Age: 44
End: 2019-12-10

## 2019-12-10 NOTE — TELEPHONE ENCOUNTER
Discussed lab results with patient.  Patient scheduled follow up to discuss labs with Dr. Santiago and receive PVX on 12/12/19.      ----- Message from Pepito Santiago MD sent at 12/2/2019  5:06 PM CST -----  Please call to let know that evaluation of pt's immune system showed that pt may benefit from an extra vaccine, Pneumovax, to decrease frequency of infections. Please schedule follow up appointment to review labs and discuss Pneumovax vaccine.

## 2019-12-12 ENCOUNTER — OFFICE VISIT (OUTPATIENT)
Dept: ALLERGY | Facility: CLINIC | Age: 44
End: 2019-12-12
Payer: COMMERCIAL

## 2019-12-12 VITALS
HEIGHT: 69 IN | SYSTOLIC BLOOD PRESSURE: 118 MMHG | DIASTOLIC BLOOD PRESSURE: 84 MMHG | BODY MASS INDEX: 27.17 KG/M2 | WEIGHT: 183.44 LBS

## 2019-12-12 DIAGNOSIS — J01.91 ACUTE RECURRENT SINUSITIS, UNSPECIFIED LOCATION: ICD-10-CM

## 2019-12-12 DIAGNOSIS — J32.9 RECURRENT SINUSITIS: ICD-10-CM

## 2019-12-12 DIAGNOSIS — J30.89 ALLERGIC RHINITIS DUE TO DUST MITE: ICD-10-CM

## 2019-12-12 DIAGNOSIS — R76.0 ABNORMAL ANTIBODY TITER: Primary | ICD-10-CM

## 2019-12-12 PROCEDURE — 99999 PR PBB SHADOW E&M-EST. PATIENT-LVL III: ICD-10-PCS | Mod: PBBFAC,,, | Performed by: ALLERGY & IMMUNOLOGY

## 2019-12-12 PROCEDURE — 90732 PPSV23 VACC 2 YRS+ SUBQ/IM: CPT | Mod: S$GLB,,, | Performed by: ALLERGY & IMMUNOLOGY

## 2019-12-12 PROCEDURE — 90471 IMMUNIZATION ADMIN: CPT | Mod: S$GLB,,, | Performed by: ALLERGY & IMMUNOLOGY

## 2019-12-12 PROCEDURE — 90732 PNEUMOCOCCAL POLYSACCHARIDE VACCINE 23-VALENT =>2YO SQ IM: ICD-10-PCS | Mod: S$GLB,,, | Performed by: ALLERGY & IMMUNOLOGY

## 2019-12-12 PROCEDURE — 99214 OFFICE O/P EST MOD 30 MIN: CPT | Mod: 25,S$GLB,, | Performed by: ALLERGY & IMMUNOLOGY

## 2019-12-12 PROCEDURE — 99214 PR OFFICE/OUTPT VISIT, EST, LEVL IV, 30-39 MIN: ICD-10-PCS | Mod: 25,S$GLB,, | Performed by: ALLERGY & IMMUNOLOGY

## 2019-12-12 PROCEDURE — 90471 PNEUMOCOCCAL POLYSACCHARIDE VACCINE 23-VALENT =>2YO SQ IM: ICD-10-PCS | Mod: S$GLB,,, | Performed by: ALLERGY & IMMUNOLOGY

## 2019-12-12 PROCEDURE — 99999 PR PBB SHADOW E&M-EST. PATIENT-LVL III: CPT | Mod: PBBFAC,,, | Performed by: ALLERGY & IMMUNOLOGY

## 2019-12-12 RX ORDER — AMOXICILLIN AND CLAVULANATE POTASSIUM 875; 125 MG/1; MG/1
1 TABLET, FILM COATED ORAL EVERY 12 HOURS
Qty: 28 TABLET | Refills: 0 | Status: SHIPPED | OUTPATIENT
Start: 2019-12-12 | End: 2019-12-26

## 2019-12-12 NOTE — PROGRESS NOTES
Subjective:       Patient ID: Gayle Cline is a 43 y.o. male.    Chief Complaint:  Follow-up (PVX vaccine/ review labs)  concern of sinusitis    HPI:   Patient presents for fu recurrent sinusitis and chronic rhinitis. Initial hx reviewed. Over last several days has had increased nasal discharge, some blood tinged, increased sinus pressure, some cough, and fatigue. Similar to previous sinus infections.      Environmental History: Pets in the home: dogs (2).  Yudy: hardwood floors  Tobacco Smoke in Home: no    Past Medical History:   Diagnosis Date    Chronic allergic rhinitis          HPV in male     Rectal. Follow by Colonrectal     Recurrent upper respiratory infection (URI)     Vitamin D insufficiency 7/12/2019         Family History   Problem Relation Age of Onset    Diabetes Father 60    Obesity Father     Stroke Maternal Grandfather     Heart disease Paternal Grandfather 60    Heart disease Maternal Uncle 40        CABG   mother w recurrent sinusitis, negative allergy testing  Dad  w eczema    Review of Systems   Constitutional: Positive for fatigue. Negative for activity change and fever.   HENT: Positive for congestion, postnasal drip and sinus pressure. Negative for rhinorrhea and sneezing.    Eyes: Negative for discharge, redness and itching.   Respiratory: Positive for cough. Negative for shortness of breath and wheezing.    Cardiovascular: Negative for chest pain.   Gastrointestinal: Negative for constipation, diarrhea, nausea and vomiting.   Genitourinary: Negative for difficulty urinating.   Musculoskeletal: Negative for joint swelling and myalgias.   Skin: Negative for rash.   Neurological: Negative for headaches.   Hematological: Does not bruise/bleed easily.   Psychiatric/Behavioral: Negative for behavioral problems and sleep disturbance.          Objective:   Physical Exam   Constitutional: He is oriented to person, place, and time. He appears well-developed and well-nourished. No  distress.   HENT:   Head: Normocephalic and atraumatic.   Right Ear: Tympanic membrane and external ear normal.   Left Ear: Tympanic membrane and external ear normal.   Nose: Nose normal.   Mouth/Throat: Oropharynx is clear and moist. No oropharyngeal exudate.   Eyes: Conjunctivae are normal. Right eye exhibits no discharge. Left eye exhibits no discharge.   Neck: Normal range of motion. Neck supple. No thyromegaly present.   Cardiovascular: Normal rate and regular rhythm.   Pulmonary/Chest: Effort normal and breath sounds normal. No respiratory distress. He has no wheezes.   Abdominal: Soft. Bowel sounds are normal. He exhibits no distension. There is no tenderness.   Musculoskeletal: Normal range of motion. He exhibits no edema.   Lymphadenopathy:     He has no cervical adenopathy.   Neurological: He is alert and oriented to person, place, and time. He exhibits normal muscle tone.   Skin: Skin is warm. No rash noted. He is not diaphoretic. No erythema.   Psychiatric: He has a normal mood and affect. His behavior is normal. Judgment and thought content normal.       Results for GRISELDA JOHNSON (MRN [id number]) as of 12/12/2019 14:57   Ref. Range 11/21/2019 10:13   A. fumigatus Class Unknown CLASS 0   Altern. alternata Class Unknown CLASS 0   Alternaria alternata Latest Ref Range: <0.10 kU/L <0.10   Aspergillus Fumigatus IgE Latest Ref Range: <0.10 kU/L <0.10   Bahia Class Unknown CLASS 0   BAHIA GRASS Latest Ref Range: <0.10 kU/L <0.10   Cat Dander Latest Ref Range: <0.10 kU/L <0.10   Cat Epithelium Class Unknown CLASS 0   Watson Class Unknown CLASS 0   Watson IgE Latest Ref Range: <0.10 kU/L <0.10   Cockroach, IgE Unknown CLASS 0   D. farinae Latest Ref Range: <0.10 kU/L 6.43 (H)   D. farinae Class Unknown CLASS 3   D. pteronyssinus Class Unknown CLASS 2   Dog Dander Class Unknown CLASS 0   Dog Dander, IgE Latest Ref Range: <0.10 kU/L <0.10   English Plantain Class Unknown CLASS 0   Marshelder Class Unknown CLASS 0    Marshelder IgE Latest Ref Range: <0.10 kU/L <0.10   Mite Dust Pteronyssinus IgE Latest Ref Range: <0.10 kU/L 2.58 (H)   [place], Class Unknown CLASS 0   Pecan [place] Tree Latest Ref Range: <0.10 kU/L <0.10   Pecan, Class Unknown CLASS 0   Plantain Latest Ref Range: <0.10 kU/L <0.10   Ragweed, Short, Class Unknown CLASS 0   Ragweed, Short, IgE Latest Ref Range: <0.10 kU/L <0.10   Ronen Grass Latest Ref Range: <0.10 kU/L <0.10   Ronen Grass Class Unknown CLASS 0   White Oak(Quercus alba) IgE Latest Ref Range: <0.10 kU/L <0.10   IgG - Serum Latest Ref Range: 650 - 1600 mg/dL 1094   IgM Latest Ref Range: 50 - 300 mg/dL 71   IgA Latest Ref Range: 40 - 350 mg/dL 161   IgE Latest Ref Range: 0 - 100 IU/mL 82   Results for JEFFGRISELDA TOMAS (MRN [id number]) as of 12/12/2019 14:57   Ref. Range 11/21/2019 10:13   S.pneumoniae Type 1 Latest Units: mcg/mL <0.3   S.pneumoniae Type 12F Latest Units: mcg/mL <0.3   S.pneumoniae Type 18C Latest Units: mcg/mL <0.3   S.pneumoniae Type 19F Latest Units: mcg/mL 0.6   S.pneumoniae Type 23F Latest Units: mcg/mL <0.3   S.pneumoniae Type 3 Latest Units: mcg/mL <0.3   S.pneumoniae Type 5 Latest Units: mcg/mL 0.3   S.pneumoniae Type 6B Latest Units: mcg/mL 0.4   S.pneumoniae Type 7F Latest Units: mcg/mL 0.6   S.pneumoniae Type 8 Latest Units: mcg/mL <0.3   S.pneumoniae Type 9N Latest Units: mcg/mL <0.3   S.pneumoniae Type 9V Abs Latest Units: mcg/mL 0.4   Strep pneumo Type 14 Latest Units: mcg/mL 0.9   Strep pneumo Type 4 Latest Units: mcg/mL <0.3       Assessment:       1. Abnormal antibody titer    2. Recurrent sinusitis    3. Allergic rhinitis due to dust mite    4. Acute recurrent sinusitis, unspecified location         Plan:       Griselda was seen today for follow-up.    Diagnoses and all orders for this visit:    Abnormal antibody titer  -     S.pneumoniae IgG Serotypes; Future    Recurrent sinusitis    Allergic rhinitis due to dust mite    Acute recurrent sinusitis, unspecified  location    Other orders  -     Pneumococcal Polysaccharide Vaccine (23 Valent) (SQ/IM)  -     amoxicillin-clavulanate 875-125mg (AUGMENTIN) 875-125 mg per tablet; Take 1 tablet by mouth every 12 (twelve) hours. for 14 days    Challenge with 23-valent pneumococcal polysaccharide vaccine, Pneumovax. Repeat pneumococcal titers in 4-6 weeks. Follow up in clinic if poor response. Counseled that if good response to Pneumovax is demonstrated, expect decreased frequency and severity of mucosal infections, and can then follow up prn. Follow up frank if recurrence of frequent mucosal infections.  If good pneumovax response and still poorly controlled rhinitis sx's may consider odactra, DM SLIT.    Cont flonase, singulair, claritin

## 2020-01-22 ENCOUNTER — LAB VISIT (OUTPATIENT)
Dept: LAB | Facility: HOSPITAL | Age: 45
End: 2020-01-22
Attending: ALLERGY & IMMUNOLOGY
Payer: COMMERCIAL

## 2020-01-22 ENCOUNTER — TELEPHONE (OUTPATIENT)
Dept: OPTOMETRY | Facility: CLINIC | Age: 45
End: 2020-01-22

## 2020-01-22 ENCOUNTER — OFFICE VISIT (OUTPATIENT)
Dept: INTERNAL MEDICINE | Facility: CLINIC | Age: 45
End: 2020-01-22
Payer: COMMERCIAL

## 2020-01-22 ENCOUNTER — OFFICE VISIT (OUTPATIENT)
Dept: OPTOMETRY | Facility: CLINIC | Age: 45
End: 2020-01-22
Payer: COMMERCIAL

## 2020-01-22 VITALS
HEART RATE: 74 BPM | DIASTOLIC BLOOD PRESSURE: 84 MMHG | OXYGEN SATURATION: 95 % | SYSTOLIC BLOOD PRESSURE: 120 MMHG | BODY MASS INDEX: 27.74 KG/M2 | WEIGHT: 183 LBS | HEIGHT: 68 IN

## 2020-01-22 DIAGNOSIS — H10.9 CONJUNCTIVITIS OF BOTH EYES, UNSPECIFIED CONJUNCTIVITIS TYPE: ICD-10-CM

## 2020-01-22 DIAGNOSIS — H16.9 KERATITIS: Primary | ICD-10-CM

## 2020-01-22 DIAGNOSIS — B96.89 ACUTE BACTERIAL RHINOSINUSITIS: Primary | ICD-10-CM

## 2020-01-22 DIAGNOSIS — R76.0 ABNORMAL ANTIBODY TITER: ICD-10-CM

## 2020-01-22 DIAGNOSIS — G47.00 INSOMNIA, UNSPECIFIED TYPE: ICD-10-CM

## 2020-01-22 DIAGNOSIS — J01.90 ACUTE BACTERIAL RHINOSINUSITIS: Primary | ICD-10-CM

## 2020-01-22 PROCEDURE — 92002 INTRM OPH EXAM NEW PATIENT: CPT | Mod: S$GLB,,, | Performed by: OPTOMETRIST

## 2020-01-22 PROCEDURE — 86317 IMMUNOASSAY INFECTIOUS AGENT: CPT

## 2020-01-22 PROCEDURE — 36415 COLL VENOUS BLD VENIPUNCTURE: CPT

## 2020-01-22 PROCEDURE — 99999 PR PBB SHADOW E&M-EST. PATIENT-LVL IV: ICD-10-PCS | Mod: PBBFAC,,, | Performed by: NURSE PRACTITIONER

## 2020-01-22 PROCEDURE — 3008F BODY MASS INDEX DOCD: CPT | Mod: CPTII,S$GLB,, | Performed by: NURSE PRACTITIONER

## 2020-01-22 PROCEDURE — 99214 OFFICE O/P EST MOD 30 MIN: CPT | Mod: S$GLB,,, | Performed by: NURSE PRACTITIONER

## 2020-01-22 PROCEDURE — 99214 PR OFFICE/OUTPT VISIT, EST, LEVL IV, 30-39 MIN: ICD-10-PCS | Mod: S$GLB,,, | Performed by: NURSE PRACTITIONER

## 2020-01-22 PROCEDURE — 99999 PR PBB SHADOW E&M-EST. PATIENT-LVL II: ICD-10-PCS | Mod: PBBFAC,,, | Performed by: OPTOMETRIST

## 2020-01-22 PROCEDURE — 3008F PR BODY MASS INDEX (BMI) DOCUMENTED: ICD-10-PCS | Mod: CPTII,S$GLB,, | Performed by: NURSE PRACTITIONER

## 2020-01-22 PROCEDURE — 99999 PR PBB SHADOW E&M-EST. PATIENT-LVL IV: CPT | Mod: PBBFAC,,, | Performed by: NURSE PRACTITIONER

## 2020-01-22 PROCEDURE — 99999 PR PBB SHADOW E&M-EST. PATIENT-LVL II: CPT | Mod: PBBFAC,,, | Performed by: OPTOMETRIST

## 2020-01-22 PROCEDURE — 92002 PR EYE EXAM, NEW PATIENT,INTERMED: ICD-10-PCS | Mod: S$GLB,,, | Performed by: OPTOMETRIST

## 2020-01-22 RX ORDER — TRAZODONE HYDROCHLORIDE 50 MG/1
50 TABLET ORAL NIGHTLY PRN
Qty: 30 TABLET | Refills: 1 | Status: SHIPPED | OUTPATIENT
Start: 2020-01-22 | End: 2022-05-13

## 2020-01-22 RX ORDER — DOXYCYCLINE HYCLATE 100 MG
100 TABLET ORAL 2 TIMES DAILY
Qty: 14 TABLET | Refills: 0 | Status: SHIPPED | OUTPATIENT
Start: 2020-01-22 | End: 2020-01-29

## 2020-01-22 RX ORDER — PREDNISONE 20 MG/1
20 TABLET ORAL DAILY
Qty: 10 TABLET | Refills: 0 | Status: SHIPPED | OUTPATIENT
Start: 2020-01-22 | End: 2020-02-01

## 2020-01-22 RX ORDER — PREDNISOLONE ACETATE 10 MG/ML
1 SUSPENSION/ DROPS OPHTHALMIC 4 TIMES DAILY
Qty: 10 ML | Refills: 0 | Status: SHIPPED | OUTPATIENT
Start: 2020-01-22 | End: 2020-02-21

## 2020-01-22 RX ORDER — ERYTHROMYCIN 5 MG/G
OINTMENT OPHTHALMIC NIGHTLY
Qty: 3.5 G | Refills: 0 | Status: SHIPPED | OUTPATIENT
Start: 2020-01-22 | End: 2020-02-21

## 2020-01-22 NOTE — PROGRESS NOTES
"INTERNAL MEDICINE CLINIC - SAME DAY APPOINTMENT  Progress Note    PRESENTING HISTORY     PCP: oJrden Thakkar MD  Chief Complaint/Reason for Visit:   No chief complaint on file.      History of Present Illness & ROS : Mr. Gayle Cline is a 44 y.o. male.    Here for same day appt.  Est'd with Dr. Thakkar.   Reports that started "feeling bad on Saturday night, congested, tired, headaches, sorethroat"; no fever, no chills. "Mild" cough, worse with "walking".   Tried taking Claritin D, but "no real relief".     Additionally, reporting that "having photosensitivity to both eyes for months".   No itching or matting.   "Redness" to both eyes. Trying over the counter Sustain with "no change, getting worse".   No resting or with activity SOB or Chest discomforts.     Review of Systems:  Eyes: denies visual changes at this time denies floaters   Gastrointestinal: no nausea or vomiting, no abdominal pain or change in bowel habits  Genitourinary: no hematuria or dysuria; denies frequency  Hematologic/Lymphatic: no easy bruising or lymphadenopathy  Musculoskeletal: no arthralgias or myalgias  Neurological: no seizures or tremors  Endocrine: no heat or cold intolerance      PAST HISTORY:     Past Medical History:   Diagnosis Date    Chronic allergic rhinitis     Gastroesophageal reflux disease without esophagitis 10/18/2016    HPV in male     Rectal. Follow by Colonrectal     Recurrent upper respiratory infection (URI)     Vitamin D insufficiency 7/12/2019       Past Surgical History:   Procedure Laterality Date    CHOLECYSTECTOMY  2012    Outside facilty       Family History   Problem Relation Age of Onset    Diabetes Father 60    Obesity Father     Stroke Maternal Grandfather     Heart disease Paternal Grandfather 60    Heart disease Maternal Uncle 40        CABG       Social History     Socioeconomic History    Marital status:      Spouse name: Magnolia    Number of children: Not on file    Years of education: " Not on file    Highest education level: Not on file   Occupational History    Not on file   Social Needs    Financial resource strain: Not hard at all    Food insecurity:     Worry: Never true     Inability: Never true    Transportation needs:     Medical: No     Non-medical: No   Tobacco Use    Smoking status: Never Smoker    Smokeless tobacco: Never Used   Substance and Sexual Activity    Alcohol use: No     Frequency: Never     Drinks per session: Patient refused     Binge frequency: Never    Drug use: No    Sexual activity: Yes     Partners: Female   Lifestyle    Physical activity:     Days per week: 3 days     Minutes per session: 10 min    Stress: Only a little   Relationships    Social connections:     Talks on phone: More than three times a week     Gets together: Once a week     Attends [employer] service: Not on file     Active member of club or organization: No     Attends meetings of clubs or organizations: Never     Relationship status:    Other Topics Concern    Not on file   Social History Narrative    : Magnolia Xavier    One step son     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      atomoxetine (STRATTERA) 40 MG capsule Take 1 capsule (40 mg total) by mouth once daily. 60 capsule 2    cholecalciferol, vitamin D3, (VITAMIN D3) 2,000 unit Cap Take 1 capsule (2,000 Units total) by mouth once daily. 90 capsule 3    fluticasone propionate (FLONASE) 50 mcg/actuation nasal spray 1 spray (50 mcg total) by Each Nare route once daily. Take during Spring and Fall. 3 Bottle 3    loratadine (CLARITIN) 10 mg tablet Take 1 tablet (10 mg total) by mouth daily as needed for Allergies.  0    montelukast (SINGULAIR) 10 mg tablet Take 1 tablet (10 mg total) by  mouth every evening. 30 tablet 11    sod chlor,bicarb/squeez bottle (NEILMED SINUS RINSE COMPLETE SANTIAGO) by sinus irrigation route.       No current facility-administered medications on file prior to visit.         Review of patient's allergies indicates:  No Known Allergies    Medications Reconciliation:   I have reconciled the patient's home medications with the patient/family. I have updated all changes.  Refer to After-Visit Medication List.    OBJECTIVE:     Vital Signs:  There were no vitals filed for this visit.  Wt Readings from Last 1 Encounters:   12/12/19 1427 83.2 kg (183 lb 6.8 oz)     There is no height or weight on file to calculate BMI.     Wt Readings from Last 3 Encounters:   01/22/20 83 kg (182 lb 15.7 oz)   12/12/19 83.2 kg (183 lb 6.8 oz)   11/21/19 80.9 kg (178 lb 5.6 oz)     Temp Readings from Last 3 Encounters:   11/08/19 97.3 °F (36.3 °C)   04/02/19 98.1 °F (36.7 °C) (Oral)   11/27/17 98.2 °F (36.8 °C) (Oral)     BP Readings from Last 3 Encounters:   01/22/20 120/84   12/12/19 118/84   11/21/19 126/82     Pulse Readings from Last 3 Encounters:   01/22/20 74   11/08/19 79   10/24/19 66         Physical Exam:  General: Well developed, well nourished. No distress.  HEENT: Head is normocephalic, atraumatic; ears are normal.   + serous fluid to left ear  + erythematous posterior oral pharynx  Eyes: OU grossly erythematous conjunctiva  Neck: Supple, symmetrical neck; trachea midline.  Lungs: Clear to auscultation bilaterally and normal respiratory effort.  Cardiovascular: Heart with regular rate and rhythm. No murmurs, gallops or rubs  Extremities: No LE edema. Pulses 2+ and symmetric.   Abdomen: Abdomen is soft, non-tender non-distended with normal bowel sounds.  Skin: Skin color, texture, turgor normal. No rashes.  Musculoskeletal: Normal gait.   Lymph Nodes: No cervical or supraclavicular adenopathy.  Neurologic: Normal strength and tone. No focal numbness or weakness.   Psychiatric: Not  "depressed.        Laboratory  Lab Results   Component Value Date    WBC 8.33 07/12/2019    HGB 14.7 07/12/2019    HCT 44.3 07/12/2019     07/12/2019    CHOL 226 (H) 07/12/2019    TRIG 72 07/12/2019    HDL 67 07/12/2019    ALT 15 07/12/2019    AST 20 07/12/2019     07/12/2019    K 4.4 07/12/2019     07/12/2019    CREATININE 0.9 07/12/2019    BUN 8 07/12/2019    CO2 27 07/12/2019    TSH 1.302 07/12/2019    PSA 0.40 07/12/2019    HGBA1C 5.3 07/12/2019       ASSESSMENT & PLAN:     Here for Same Day Appt.     Acute bacterial rhinosinusitis  Chronic Allergies:   Seen by me in 10/2019 for Chronic Cough and referred to Pulmonary  Seen by his Allergist in Dec 2019 for Acute Recurrent Sinusitis, Rx'd Augmentin, Singulair, Claritin and Flonase. Also rec'd the Pneumovax with hopes of "minimizing" recurrence of Sinusitis.   ? Benefit from CT Sinus and having ENT see him?  -     doxycycline (VIBRA-TABS) 100 MG tablet; Take 1 tablet (100 mg total) by mouth 2 (two) times daily. for 7 days  Dispense: 14 tablet; Refill: 0  -     predniSONE (DELTASONE) 20 MG tablet; Take 1 tablet (20 mg total) by mouth once daily. for 5 days  Dispense: 10 tablet; Refill: 0    Insomnia, unspecified type  -     traZODone (DESYREL) 50 MG tablet; Take 1 tablet (50 mg total) by mouth nightly as needed for Insomnia.  Dispense: 30 tablet; Refill: 1    Conjunctivitis of both eyes, unspecified conjunctivitis type  amb referral to Optometry today with Dr. Santos's at 340 pm      Future Appointments   Date Time Provider Department Center   1/22/2020  3:00 PM Tanja Ashby OD [facility] OPTOMTY [facility]   1/24/2020  9:30 AM Jorden Thakkar MD [facility] IM [facility] PCW        Medication List           Accurate as of January 22, 2020  1:47 PM. If you have any questions, ask your nurse or doctor.               START taking these medications    doxycycline 100 MG tablet  Commonly known as:  VIBRA-TABS  Take 1 tablet (100 mg total) by mouth 2 (two) times " daily. for 7 days  Started by:  BRANDO Waldron     predniSONE 20 MG tablet  Commonly known as:  DELTASONE  Take 1 tablet (20 mg total) by mouth once daily. for 5 days  Started by:  BRANDO Waldron     traZODone 50 MG tablet  Commonly known as:  DESYREL  Take 1 tablet (50 mg total) by mouth nightly as needed for Insomnia.  Started by:  BRANDO Waldron        CONTINUE taking these medications    ascorbic acid (vitamin C) 1000 MG tablet  Commonly known as:  Vitamin C  Take 1 tablet (1,000 mg total) by mouth once daily.     atomoxetine 40 MG capsule  Commonly known as:  STRATTERA  Take 1 capsule (40 mg total) by mouth once daily.     cholecalciferol (vitamin D3) 50 mcg (2,000 unit) Cap  Commonly known as:  Vitamin D3  Take 1 capsule (2,000 Units total) by mouth once daily.     fluticasone propionate 50 mcg/actuation nasal spray  Commonly known as:  FLONASE  1 spray (50 mcg total) by Each Nare route once daily. Take during Spring and Fall.     loratadine 10 mg tablet  Commonly known as:  CLARITIN  Take 1 tablet (10 mg total) by mouth daily as needed for Allergies.     montelukast 10 mg tablet  Commonly known as:  SINGULAIR  Take 1 tablet (10 mg total) by mouth every evening.     NEILMED SINUS RINSE COMPLETE SANTIAGO           Where to Get Your Medications      These medications were sent to [facility]  [address] [place] [id number]    Hours:  Mon-Fri, 8a-5:30p Phone:  [phone]   · doxycycline 100 MG tablet  · predniSONE 20 MG tablet  · traZODone 50 MG tablet       Signing Physician:  BRANDO Waldron

## 2020-01-22 NOTE — PROGRESS NOTES
HPI     Urgent visit   Patient is here for Redness OU and photophobia  for the past week  Biofinty multifocal lens.pt denies sleeping in lens  Optifree solution     Glasses? Yes  Contacts? Yes, denies sleeping in contact lens.  H/o eye surgery, injections or laser: LASIK  H/o eye injury: no  Known eye conditions? no  Family h/o eye conditions? no  Eye gtts? non    (-) Flashes (-)  Floaters (-) Mucous   (+)  Tearing (+) Itching (+) Burning   (-) Headaches (+) Eye Pain/discomfort (+) Irritation   (+)  Redness (-) Double vision (+) Blurry vision        Last edited by YUSUF Read on 1/22/2020  3:08 PM. (History)            Assessment /Plan     For exam results, see Encounter Report.    Keratitis  -     prednisoLONE acetate (PRED FORTE) 1 % DrpS; Place 1 drop into both eyes 4 (four) times daily. 4x/day for 1 wk, 3x/day for 1 wk, 2x/day for 4 days, 1x/day for 4 days then STOP  Dispense: 10 mL; Refill: 0  -     erythromycin (ROMYCIN) ophthalmic ointment; Place into both eyes every evening.  Dispense: 3.5 g; Refill: 0      Keratitis noted OU. Pt reports vision is clearer w/CL than SRx. Explained to pt that the surface irritation is the issue. Pt reports blur at computer w/glasses but has lined bifocal. Pt advised to try PAL. Rx Pred Forte QID OU w/taper and Emycin QHS OU. RTC 4 weeks f/u and possible routine and Cl fitting. Consider daily CL>

## 2020-01-28 LAB
DEPRECATED S PNEUM 1 IGG SER-MCNC: 4 MCG/ML
DEPRECATED S PNEUM12 IGG SER-MCNC: 1.1 MCG/ML
DEPRECATED S PNEUM14 IGG SER-MCNC: 27.5 MCG/ML
DEPRECATED S PNEUM19 IGG SER-MCNC: 4.9 MCG/ML
DEPRECATED S PNEUM23 IGG SER-MCNC: 2.9 MCG/ML
DEPRECATED S PNEUM3 IGG SER-MCNC: 1 MCG/ML
DEPRECATED S PNEUM4 IGG SER-MCNC: 0.3 MCG/ML
DEPRECATED S PNEUM5 IGG SER-MCNC: 9.7 MCG/ML
DEPRECATED S PNEUM8 IGG SER-MCNC: 3.2 MCG/ML
DEPRECATED S PNEUM9 IGG SER-MCNC: 0.7 MCG/ML
S PNEUM DA 18C IGG SER-MCNC: 4.9 MCG/ML
S PNEUM DA 6B IGG SER-MCNC: 4 MCG/ML
S PNEUM DA 7F IGG SER-MCNC: 18.7 MCG/ML
S PNEUM DA 9V IGG SER-MCNC: 2.3 MCG/ML

## 2020-01-31 ENCOUNTER — TELEPHONE (OUTPATIENT)
Dept: OPTOMETRY | Facility: CLINIC | Age: 45
End: 2020-01-31

## 2020-02-26 ENCOUNTER — OFFICE VISIT (OUTPATIENT)
Dept: OPTOMETRY | Facility: CLINIC | Age: 45
End: 2020-02-26

## 2020-02-26 ENCOUNTER — OFFICE VISIT (OUTPATIENT)
Dept: OPTOMETRY | Facility: CLINIC | Age: 45
End: 2020-02-26
Payer: COMMERCIAL

## 2020-02-26 DIAGNOSIS — H52.203 MYOPIA WITH ASTIGMATISM AND PRESBYOPIA, BILATERAL: ICD-10-CM

## 2020-02-26 DIAGNOSIS — H16.9 KERATITIS: Primary | ICD-10-CM

## 2020-02-26 DIAGNOSIS — H52.13 MYOPIA WITH ASTIGMATISM AND PRESBYOPIA, BILATERAL: Primary | ICD-10-CM

## 2020-02-26 DIAGNOSIS — H52.4 MYOPIA WITH ASTIGMATISM AND PRESBYOPIA, BILATERAL: Primary | ICD-10-CM

## 2020-02-26 DIAGNOSIS — Z46.0 FITTING AND ADJUSTMENT OF SPECTACLES AND CONTACT LENSES: ICD-10-CM

## 2020-02-26 DIAGNOSIS — H52.203 MYOPIA WITH ASTIGMATISM AND PRESBYOPIA, BILATERAL: Primary | ICD-10-CM

## 2020-02-26 DIAGNOSIS — H52.4 MYOPIA WITH ASTIGMATISM AND PRESBYOPIA, BILATERAL: ICD-10-CM

## 2020-02-26 DIAGNOSIS — H52.13 MYOPIA WITH ASTIGMATISM AND PRESBYOPIA, BILATERAL: ICD-10-CM

## 2020-02-26 PROCEDURE — 92014 COMPRE OPH EXAM EST PT 1/>: CPT | Mod: S$GLB,,, | Performed by: OPTOMETRIST

## 2020-02-26 PROCEDURE — 92310 CONTACT LENS FITTING OU: CPT | Mod: CSM,,, | Performed by: OPTOMETRIST

## 2020-02-26 PROCEDURE — 99999 PR PBB SHADOW E&M-EST. PATIENT-LVL II: CPT | Mod: PBBFAC,,, | Performed by: OPTOMETRIST

## 2020-02-26 PROCEDURE — 92015 PR REFRACTION: ICD-10-PCS | Mod: S$GLB,,, | Performed by: OPTOMETRIST

## 2020-02-26 PROCEDURE — 92014 PR EYE EXAM, EST PATIENT,COMPREHESV: ICD-10-PCS | Mod: S$GLB,,, | Performed by: OPTOMETRIST

## 2020-02-26 PROCEDURE — 99999 PR PBB SHADOW E&M-EST. PATIENT-LVL II: ICD-10-PCS | Mod: PBBFAC,,, | Performed by: OPTOMETRIST

## 2020-02-26 PROCEDURE — 92015 DETERMINE REFRACTIVE STATE: CPT | Mod: S$GLB,,, | Performed by: OPTOMETRIST

## 2020-02-26 PROCEDURE — 92310 PR CONTACT LENS FITTING (NO CHANGE): ICD-10-PCS | Mod: CSM,,, | Performed by: OPTOMETRIST

## 2020-02-26 NOTE — PROGRESS NOTES
HPI     KEVON:>1yr   Glasses? Yes   Contacts? Yes biofinity yes   H/o eye surgery, injections or laser: LASIK   H/o eye injury: hit in eye with shirt tag and damaged flap ,   Known eye conditions? no  Family h/o eye conditions? no  Eye gtts?Pred forte PRN     (-) Flashes (-) Floaters (-) Mucous   (-) Tearing (-) Itching (-) Burning   (-) Headaches (-) Eye Pain/discomfort (+) Irritation foreign body   sensation   (-) Redness (-) Double vision (-) Blurry vision    Diabetic? (-)  A1c?  (Hemoglobin A1C       Date                     Value               Ref Range             Status                07/12/2019               5.3                 4.0 - 5.6 %           Final              Comment:    ADA Screening Guidelines:  5.7-6.4%  Consistent with   prediabetes  >or=6.5%  Consistent with diabetes  High levels of fetal   hemoglobin interfere with the HbA1C  assay. Heterozygous hemoglobin   variants (HbS, HgC, etc)do  not significantly interfere with this assay.     However, presence of multiple variants may affect accuracy.    ----------)        Last edited by Irma Ramirez on 2/26/2020  3:41 PM. (History)            Assessment /Plan     For exam results, see Encounter Report.    Keratitis    Myopia with astigmatism and presbyopia, bilateral      1. Resolved. D/c PF QD.   2. SRx released to patient. Patient educated on lens options. Normal ocular health. RTC 1 year for routine exam.   CL trials dispensed. Would like to see the pt back in 1 week to see if NVA improves when not dilated. RTC 1 week CLFU

## 2020-03-04 ENCOUNTER — OFFICE VISIT (OUTPATIENT)
Dept: OPTOMETRY | Facility: CLINIC | Age: 45
End: 2020-03-04

## 2020-03-04 DIAGNOSIS — H52.4 MYOPIA WITH ASTIGMATISM AND PRESBYOPIA, BILATERAL: Primary | ICD-10-CM

## 2020-03-04 DIAGNOSIS — H52.13 MYOPIA WITH ASTIGMATISM AND PRESBYOPIA, BILATERAL: Primary | ICD-10-CM

## 2020-03-04 DIAGNOSIS — H52.203 MYOPIA WITH ASTIGMATISM AND PRESBYOPIA, BILATERAL: Primary | ICD-10-CM

## 2020-03-04 PROCEDURE — 92499 UNLISTED OPH SVC/PROCEDURE: CPT | Mod: ,,, | Performed by: OPTOMETRIST

## 2020-03-04 PROCEDURE — 92499 PR CONTACT LENS F/U LEV 1: ICD-10-PCS | Mod: ,,, | Performed by: OPTOMETRIST

## 2020-03-05 ENCOUNTER — PATIENT MESSAGE (OUTPATIENT)
Dept: OPTOMETRY | Facility: CLINIC | Age: 45
End: 2020-03-05

## 2020-03-05 NOTE — PROGRESS NOTES
HPI     Pt is coming in for cl  F/u multifocal dailies     Last edited by Irma Ramirez on 3/4/2020  4:01 PM. (History)            Assessment /Plan     For exam results, see Encounter Report.    Myopia with astigmatism and presbyopia, bilateral      Pt is not adjusting well to Dailies Total 1 MF. Pt states that he can not see the computer and has concerns because he will be starting school and needs to be able to see the computer. Pt reports that he saw better w/his Biofinity MF. Pt advised that MF CL may only offer Distance and Reading and not intermediate/computer range. When eye dominance was tested today the pt insisted that he didn't like MV in the past. Pt advised that Biofinity MF usually come w/a dominant eye. Pt will call w/old Rx to confirm if his OS was a D lens or if both were D lenses. Order trial lenses. Pt needs CLFU at dispense. Start w/Rx4 D in both eyes.

## 2020-03-12 ENCOUNTER — TELEPHONE (OUTPATIENT)
Dept: OPTOMETRY | Facility: CLINIC | Age: 45
End: 2020-03-12

## 2020-03-12 NOTE — TELEPHONE ENCOUNTER
Scheduled contact dispense 03/24 @ 4----- Message from Mamta Vargas sent at 3/12/2020  9:21 AM CDT -----  All of the patient trials are in.    Thanks,  Mamta

## 2020-03-20 ENCOUNTER — TELEPHONE (OUTPATIENT)
Dept: OPTOMETRY | Facility: CLINIC | Age: 45
End: 2020-03-20

## 2020-10-27 DIAGNOSIS — J30.9 CHRONIC ALLERGIC RHINITIS: ICD-10-CM

## 2020-10-27 RX ORDER — FLUTICASONE PROPIONATE 50 MCG
1 SPRAY, SUSPENSION (ML) NASAL DAILY
Qty: 48 G | Refills: 3 | Status: SHIPPED | OUTPATIENT
Start: 2020-10-27

## 2020-12-23 ENCOUNTER — OFFICE VISIT (OUTPATIENT)
Dept: URGENT CARE | Facility: CLINIC | Age: 45
End: 2020-12-23
Payer: COMMERCIAL

## 2020-12-23 VITALS
RESPIRATION RATE: 18 BRPM | TEMPERATURE: 99 F | OXYGEN SATURATION: 95 % | BODY MASS INDEX: 26.96 KG/M2 | WEIGHT: 182 LBS | HEART RATE: 100 BPM | SYSTOLIC BLOOD PRESSURE: 116 MMHG | DIASTOLIC BLOOD PRESSURE: 79 MMHG | HEIGHT: 69 IN

## 2020-12-23 DIAGNOSIS — R50.9 FEVER, UNSPECIFIED FEVER CAUSE: ICD-10-CM

## 2020-12-23 DIAGNOSIS — U07.1 COVID-19: Primary | ICD-10-CM

## 2020-12-23 DIAGNOSIS — R05.9 COUGH: ICD-10-CM

## 2020-12-23 LAB
CTP QC/QA: YES
FLUAV AG NPH QL: NEGATIVE
FLUBV AG NPH QL: NEGATIVE
SARS-COV-2 AG RESP QL IA.RAPID: POSITIVE

## 2020-12-23 PROCEDURE — 87804 INFLUENZA ASSAY W/OPTIC: CPT | Mod: 59,QW,, | Performed by: NURSE PRACTITIONER

## 2020-12-23 PROCEDURE — 87804 POCT INFLUENZA A/B: ICD-10-PCS | Mod: 59,QW,, | Performed by: NURSE PRACTITIONER

## 2020-12-23 PROCEDURE — 3008F PR BODY MASS INDEX (BMI) DOCUMENTED: ICD-10-PCS | Mod: S$GLB,,, | Performed by: NURSE PRACTITIONER

## 2020-12-23 PROCEDURE — 99214 PR OFFICE/OUTPT VISIT, EST, LEVL IV, 30-39 MIN: ICD-10-PCS | Mod: 25,S$GLB,, | Performed by: NURSE PRACTITIONER

## 2020-12-23 PROCEDURE — 87804 INFLUENZA ASSAY W/OPTIC: CPT | Mod: QW,S$GLB,, | Performed by: NURSE PRACTITIONER

## 2020-12-23 PROCEDURE — 87426 SARS CORONAVIRUS 2 ANTIGEN POCT: ICD-10-PCS | Mod: QW,S$GLB,, | Performed by: NURSE PRACTITIONER

## 2020-12-23 PROCEDURE — 3008F BODY MASS INDEX DOCD: CPT | Mod: S$GLB,,, | Performed by: NURSE PRACTITIONER

## 2020-12-23 PROCEDURE — 99214 OFFICE O/P EST MOD 30 MIN: CPT | Mod: 25,S$GLB,, | Performed by: NURSE PRACTITIONER

## 2020-12-23 PROCEDURE — 87426 SARSCOV CORONAVIRUS AG IA: CPT | Mod: QW,S$GLB,, | Performed by: NURSE PRACTITIONER

## 2020-12-23 RX ORDER — PROMETHAZINE HYDROCHLORIDE AND DEXTROMETHORPHAN HYDROBROMIDE 6.25; 15 MG/5ML; MG/5ML
5 SYRUP ORAL
Qty: 160 ML | Refills: 0 | Status: SHIPPED | OUTPATIENT
Start: 2020-12-23 | End: 2022-05-13

## 2020-12-23 NOTE — PROGRESS NOTES
"Subjective:       Patient ID: Gayle Cline is a 44 y.o. male.    Vitals:  height is 5' 9" (1.753 m) and weight is 82.6 kg (182 lb). His temperature is 99.3 °F (37.4 °C). His blood pressure is 116/79 and his pulse is 100. His respiration is 18 and oxygen saturation is 95%.     Chief Complaint: Fever, Headache, and Cough    T max reported at 102. Last dose of Ibuprofen around 0700 today.   Spouse tested negative for covid on Monday.  Has not yet had influenza vaccination.    Fever   This is a new problem. The current episode started yesterday. The problem occurs constantly. The problem has been gradually worsening. Associated symptoms include congestion, coughing, headaches and muscle aches. Pertinent negatives include no abdominal pain, chest pain, diarrhea, ear pain, nausea, rash, sleepiness, sore throat, urinary pain, vomiting or wheezing. He has tried NSAIDs for the symptoms. The treatment provided mild relief.   Headache   Associated symptoms include coughing, a fever and muscle aches. Pertinent negatives include no abdominal pain, ear pain, nausea, sinus pressure, sore throat or vomiting.   Cough  Associated symptoms include chills, a fever, headaches and myalgias. Pertinent negatives include no chest pain, ear pain, rash, sore throat, shortness of breath or wheezing.       Constitution: Positive for chills and fever.   HENT: Positive for congestion. Negative for ear pain, sinus pain, sinus pressure and sore throat.    Cardiovascular: Negative for chest pain.   Respiratory: Positive for cough. Negative for sputum production, shortness of breath and wheezing.    Gastrointestinal: Negative for abdominal pain, nausea, vomiting and diarrhea.   Genitourinary: Negative for dysuria.   Musculoskeletal: Positive for muscle ache.   Skin: Negative for rash.   Neurological: Positive for headaches.       Objective:      Physical Exam   Constitutional: He appears well-developed. He is cooperative.  Non-toxic appearance. He " does not appear ill. No distress.   HENT:   Head: Normocephalic and atraumatic.   Ears:   Right Ear: Hearing, tympanic membrane, external ear and ear canal normal.   Left Ear: Hearing, tympanic membrane, external ear and ear canal normal.   Nose: Nose normal. No mucosal edema, rhinorrhea or nasal deformity. No epistaxis. Right sinus exhibits no maxillary sinus tenderness and no frontal sinus tenderness. Left sinus exhibits no maxillary sinus tenderness and no frontal sinus tenderness.   Mouth/Throat: Uvula is midline, oropharynx is clear and moist and mucous membranes are normal. No trismus in the jaw. Normal dentition. No uvula swelling. No oropharyngeal exudate, posterior oropharyngeal edema or posterior oropharyngeal erythema.   Eyes: Conjunctivae and lids are normal. No scleral icterus.   Neck: Trachea normal, full passive range of motion without pain and phonation normal. Neck supple. No neck rigidity. No edema and no erythema present.   Cardiovascular: Normal rate, regular rhythm and normal heart sounds.   No murmur heard.  Pulmonary/Chest: Effort normal and breath sounds normal. No respiratory distress. He has no decreased breath sounds. He has no wheezes. He has no rhonchi.   Abdominal: Normal appearance and bowel sounds are normal. There is no abdominal tenderness.   Neurological: He is alert. He exhibits normal muscle tone. Coordination normal.   Skin: Skin is warm, dry, intact, not diaphoretic and not pale. Psychiatric: His speech is normal and behavior is normal. Mood, judgment and thought content normal.   Nursing note and vitals reviewed.        Assessment:       1. COVID-19    2. Fever, unspecified fever cause    3. Cough        Plan:         COVID-19    Fever, unspecified fever cause  -     POCT Influenza A/B  -     SARS Coronavirus 2 Antigen, POCT    Cough            Treat symptomatically.  To ER if you experience any shortness of breath.

## 2021-04-05 ENCOUNTER — PATIENT MESSAGE (OUTPATIENT)
Dept: ADMINISTRATIVE | Facility: HOSPITAL | Age: 46
End: 2021-04-05

## 2021-07-07 ENCOUNTER — PATIENT MESSAGE (OUTPATIENT)
Dept: ADMINISTRATIVE | Facility: HOSPITAL | Age: 46
End: 2021-07-07

## 2021-10-04 ENCOUNTER — PATIENT MESSAGE (OUTPATIENT)
Dept: ADMINISTRATIVE | Facility: HOSPITAL | Age: 46
End: 2021-10-04

## 2022-01-26 ENCOUNTER — PATIENT MESSAGE (OUTPATIENT)
Dept: ADMINISTRATIVE | Facility: HOSPITAL | Age: 47
End: 2022-01-26
Payer: COMMERCIAL

## 2022-03-16 ENCOUNTER — PATIENT MESSAGE (OUTPATIENT)
Dept: ADMINISTRATIVE | Facility: HOSPITAL | Age: 47
End: 2022-03-16
Payer: COMMERCIAL

## 2022-05-13 ENCOUNTER — OFFICE VISIT (OUTPATIENT)
Dept: URGENT CARE | Facility: CLINIC | Age: 47
End: 2022-05-13
Payer: COMMERCIAL

## 2022-05-13 VITALS
OXYGEN SATURATION: 97 % | DIASTOLIC BLOOD PRESSURE: 80 MMHG | HEART RATE: 72 BPM | HEIGHT: 69 IN | SYSTOLIC BLOOD PRESSURE: 113 MMHG | RESPIRATION RATE: 18 BRPM | BODY MASS INDEX: 25.18 KG/M2 | WEIGHT: 170 LBS | TEMPERATURE: 98 F

## 2022-05-13 DIAGNOSIS — J20.9 ACUTE BRONCHITIS, UNSPECIFIED ORGANISM: ICD-10-CM

## 2022-05-13 DIAGNOSIS — J01.00 ACUTE NON-RECURRENT MAXILLARY SINUSITIS: Primary | ICD-10-CM

## 2022-05-13 PROCEDURE — 3074F PR MOST RECENT SYSTOLIC BLOOD PRESSURE < 130 MM HG: ICD-10-PCS | Mod: S$GLB,,, | Performed by: STUDENT IN AN ORGANIZED HEALTH CARE EDUCATION/TRAINING PROGRAM

## 2022-05-13 PROCEDURE — 1159F PR MEDICATION LIST DOCUMENTED IN MEDICAL RECORD: ICD-10-PCS | Mod: S$GLB,,, | Performed by: STUDENT IN AN ORGANIZED HEALTH CARE EDUCATION/TRAINING PROGRAM

## 2022-05-13 PROCEDURE — 3008F BODY MASS INDEX DOCD: CPT | Mod: S$GLB,,, | Performed by: STUDENT IN AN ORGANIZED HEALTH CARE EDUCATION/TRAINING PROGRAM

## 2022-05-13 PROCEDURE — 1160F PR REVIEW ALL MEDS BY PRESCRIBER/CLIN PHARMACIST DOCUMENTED: ICD-10-PCS | Mod: S$GLB,,, | Performed by: STUDENT IN AN ORGANIZED HEALTH CARE EDUCATION/TRAINING PROGRAM

## 2022-05-13 PROCEDURE — 3079F DIAST BP 80-89 MM HG: CPT | Mod: S$GLB,,, | Performed by: STUDENT IN AN ORGANIZED HEALTH CARE EDUCATION/TRAINING PROGRAM

## 2022-05-13 PROCEDURE — 3008F PR BODY MASS INDEX (BMI) DOCUMENTED: ICD-10-PCS | Mod: S$GLB,,, | Performed by: STUDENT IN AN ORGANIZED HEALTH CARE EDUCATION/TRAINING PROGRAM

## 2022-05-13 PROCEDURE — 99214 PR OFFICE/OUTPT VISIT, EST, LEVL IV, 30-39 MIN: ICD-10-PCS | Mod: 25,S$GLB,, | Performed by: STUDENT IN AN ORGANIZED HEALTH CARE EDUCATION/TRAINING PROGRAM

## 2022-05-13 PROCEDURE — 96372 PR INJECTION,THERAP/PROPH/DIAG2ST, IM OR SUBCUT: ICD-10-PCS | Mod: S$GLB,,, | Performed by: STUDENT IN AN ORGANIZED HEALTH CARE EDUCATION/TRAINING PROGRAM

## 2022-05-13 PROCEDURE — 1159F MED LIST DOCD IN RCRD: CPT | Mod: S$GLB,,, | Performed by: STUDENT IN AN ORGANIZED HEALTH CARE EDUCATION/TRAINING PROGRAM

## 2022-05-13 PROCEDURE — 99214 OFFICE O/P EST MOD 30 MIN: CPT | Mod: 25,S$GLB,, | Performed by: STUDENT IN AN ORGANIZED HEALTH CARE EDUCATION/TRAINING PROGRAM

## 2022-05-13 PROCEDURE — 3079F PR MOST RECENT DIASTOLIC BLOOD PRESSURE 80-89 MM HG: ICD-10-PCS | Mod: S$GLB,,, | Performed by: STUDENT IN AN ORGANIZED HEALTH CARE EDUCATION/TRAINING PROGRAM

## 2022-05-13 PROCEDURE — 1160F RVW MEDS BY RX/DR IN RCRD: CPT | Mod: S$GLB,,, | Performed by: STUDENT IN AN ORGANIZED HEALTH CARE EDUCATION/TRAINING PROGRAM

## 2022-05-13 PROCEDURE — 3074F SYST BP LT 130 MM HG: CPT | Mod: S$GLB,,, | Performed by: STUDENT IN AN ORGANIZED HEALTH CARE EDUCATION/TRAINING PROGRAM

## 2022-05-13 PROCEDURE — 96372 THER/PROPH/DIAG INJ SC/IM: CPT | Mod: S$GLB,,, | Performed by: STUDENT IN AN ORGANIZED HEALTH CARE EDUCATION/TRAINING PROGRAM

## 2022-05-13 RX ORDER — AMOXICILLIN AND CLAVULANATE POTASSIUM 875; 125 MG/1; MG/1
1 TABLET, FILM COATED ORAL EVERY 12 HOURS
Qty: 20 TABLET | Refills: 0 | Status: SHIPPED | OUTPATIENT
Start: 2022-05-13 | End: 2022-05-23

## 2022-05-13 RX ORDER — METHYLPREDNISOLONE 4 MG/1
TABLET ORAL
Qty: 21 EACH | Refills: 0 | Status: SHIPPED | OUTPATIENT
Start: 2022-05-14 | End: 2022-06-03

## 2022-05-13 RX ORDER — DEXAMETHASONE SODIUM PHOSPHATE 4 MG/ML
8 INJECTION, SOLUTION INTRA-ARTICULAR; INTRALESIONAL; INTRAMUSCULAR; INTRAVENOUS; SOFT TISSUE
Status: COMPLETED | OUTPATIENT
Start: 2022-05-13 | End: 2022-05-13

## 2022-05-13 RX ORDER — ALBUTEROL SULFATE 90 UG/1
1-2 AEROSOL, METERED RESPIRATORY (INHALATION) EVERY 6 HOURS PRN
Qty: 18 G | Refills: 0 | Status: SHIPPED | OUTPATIENT
Start: 2022-05-13

## 2022-05-13 RX ORDER — PROMETHAZINE HYDROCHLORIDE AND DEXTROMETHORPHAN HYDROBROMIDE 6.25; 15 MG/5ML; MG/5ML
5 SYRUP ORAL
Qty: 118 ML | Refills: 0 | Status: SHIPPED | OUTPATIENT
Start: 2022-05-13 | End: 2022-05-23

## 2022-05-13 RX ADMIN — DEXAMETHASONE SODIUM PHOSPHATE 8 MG: 4 INJECTION, SOLUTION INTRA-ARTICULAR; INTRALESIONAL; INTRAMUSCULAR; INTRAVENOUS; SOFT TISSUE at 09:05

## 2022-05-13 NOTE — PROGRESS NOTES
"Subjective:       Patient ID: Gayle Cline is a 46 y.o. male.    Vitals:  height is 5' 9" (1.753 m) and weight is 77.1 kg (170 lb). His oral temperature is 98.3 °F (36.8 °C). His blood pressure is 113/80 and his pulse is 72. His respiration is 18 and oxygen saturation is 97%.     Chief Complaint: Cough (X 5 days.), post nasal drip  (X 1 week ), and Nasal Congestion    Patient is a 46-year-old male with a past medical history of ADHD, chronic allergic rhinitis, and GERD who presents to clinic for evaluation of possible sinus infection.  Patient reports he is vaccinated for COVID however not influenza.  Patient denies any recent or known sick exposure.  Patient reports symptoms x3 days.  Patient states symptoms began after cutting his grass.  Patient states mowed over approximately 100 piles of dirt or ant beds.  Patient states that he breathed a fair amount of this in.  Patient states started feeling bad the following day.  Patient states believes this may have triggered his chronic allergies.  Patient believes this may have led to his sinus infection.  Patient states he has taking Claritin D with little relief of symptoms.  Patient complaining of nasal sinus congestion with postnasal drainage, scratchy throat, sinus pressure, nonproductive cough, generalized body aches, and sinus related headaches.  Patient believes generalized body aches are from his yard work.  Patient denies any acute fever or chills, ear pain, chest pain or palpitations, shortness of breath or abnormal breath sounds, abdominal pain, nausea or vomiting, diarrhea, rash, dizziness, or change in mentation.      Constitution: Negative. Negative for chills, sweating, fatigue and fever.   HENT: Positive for congestion, postnasal drip, sinus pressure and sore throat (Reports scratchy throat). Negative for ear pain, trouble swallowing and voice change.    Neck: neck negative. Negative for painful lymph nodes.   Cardiovascular: Negative.  Negative for " chest pain and palpitations.   Eyes: Negative.    Respiratory: Positive for cough. Negative for chest tightness, sputum production, shortness of breath and wheezing.    Gastrointestinal: Negative.  Negative for abdominal pain, nausea, vomiting and diarrhea.   Endocrine: negative.   Genitourinary: Negative.    Musculoskeletal: Positive for muscle ache.   Skin: Negative.  Negative for color change, pale, rash and erythema.   Allergic/Immunologic: Positive for seasonal allergies.   Neurological: Positive for headaches. Negative for dizziness, light-headedness, passing out, disorientation and altered mental status.   Hematologic/Lymphatic: Negative.  Negative for swollen lymph nodes.   Psychiatric/Behavioral: Negative.  Negative for altered mental status, disorientation and confusion.       Objective:      Physical Exam   Constitutional: He is oriented to person, place, and time. He appears well-developed. He is cooperative.  Non-toxic appearance. He does not appear ill. No distress.   HENT:   Head: Normocephalic and atraumatic.   Ears:   Right Ear: Hearing, tympanic membrane, external ear and ear canal normal.   Left Ear: Hearing, tympanic membrane, external ear and ear canal normal.   Nose: Rhinorrhea and congestion present. No mucosal edema or nasal deformity. No epistaxis. Right sinus exhibits maxillary sinus tenderness. Right sinus exhibits no frontal sinus tenderness. Left sinus exhibits maxillary sinus tenderness. Left sinus exhibits no frontal sinus tenderness.   Mouth/Throat: Uvula is midline and mucous membranes are normal. Mucous membranes are moist. No trismus in the jaw. Normal dentition. No uvula swelling. Posterior oropharyngeal erythema (Mildly erythemic with postnasal drainage oropharynx) present. No oropharyngeal exudate. Oropharynx is clear.   Eyes: Conjunctivae and lids are normal. Pupils are equal, round, and reactive to light. Right eye exhibits no discharge. Left eye exhibits no discharge. No  scleral icterus.   Neck: Trachea normal and phonation normal. Neck supple. No neck rigidity present.   Cardiovascular: Normal rate, regular rhythm, normal heart sounds and normal pulses.   Pulmonary/Chest: Effort normal and breath sounds normal. No respiratory distress (Occasional cough noted.  Equal rise and fall.  Able speak in full complete sentences.). He has no wheezes. He has no rhonchi. He has no rales.   Abdominal: Normal appearance and bowel sounds are normal. He exhibits no distension. Soft. There is no abdominal tenderness.   Musculoskeletal: Normal range of motion.         General: No deformity. Normal range of motion.      Cervical back: He exhibits no tenderness.   Lymphadenopathy:     He has no cervical adenopathy.   Neurological: He is alert and oriented to person, place, and time. He exhibits normal muscle tone. Coordination normal.   Skin: Skin is warm, dry, intact, not diaphoretic, not pale and no rash. Capillary refill takes less than 2 seconds. No erythema   Psychiatric: His speech is normal and behavior is normal. Judgment and thought content normal.   Nursing note and vitals reviewed.        Assessment:       1. Acute non-recurrent maxillary sinusitis    2. Acute bronchitis, unspecified organism          Plan:         Acute non-recurrent maxillary sinusitis    Acute bronchitis, unspecified organism    Other orders  -     amoxicillin-clavulanate 875-125mg (AUGMENTIN) 875-125 mg per tablet; Take 1 tablet by mouth every 12 (twelve) hours. for 10 days  Dispense: 20 tablet; Refill: 0  -     dexamethasone injection 8 mg  -     albuterol (VENTOLIN HFA) 90 mcg/actuation inhaler; Inhale 1-2 puffs into the lungs every 6 (six) hours as needed for Wheezing or Shortness of Breath. Rescue  Dispense: 18 g; Refill: 0  -     promethazine-dextromethorphan (PROMETHAZINE-DM) 6.25-15 mg/5 mL Syrp; Take 5 mLs by mouth every 4 to 6 hours as needed (Cough).  Dispense: 118 mL; Refill: 0  -     methylPREDNISolone  (MEDROL DOSEPACK) 4 mg tablet; use as directed  Dispense: 21 each; Refill: 0                 Labs:  Patient refused need for any point of care testing.  Decadron 8 mg IM in clinic.  Patient tolerated well.  No complications noted.  Take medications as prescribed.  Tylenol/Motrin per package instructions for any pain or fever.  Assure adequate hydration and rest.  Follow-up with PCP in 1-2 days.  Return to clinic as needed.  To ED for any new or acutely worsening symptoms.  Patient in agreement with plan of care.    DISCLAIMER: Please note that my documentation in this Electronic Healthcare Record was produced using speech recognition software and therefore may contain errors related to that software system.These could include grammar, punctuation and spelling errors or the inclusion/exclusion of phrases that were not intended. Garbled syntax, mangled pronouns, and other bizarre constructions may be attributed to that software system.

## 2022-09-12 NOTE — PROGRESS NOTES
Contact lens exam done, see exam of same date for documentation.    Detail Level: Detailed Quality 130: Documentation Of Current Medications In The Medical Record: Current Medications Documented

## 2023-04-13 ENCOUNTER — OCCUPATIONAL HEALTH (OUTPATIENT)
Dept: URGENT CARE | Facility: CLINIC | Age: 48
End: 2023-04-13

## 2023-04-13 PROCEDURE — 80305 PR COLLECTION ONLY DRUG SCREEN: ICD-10-PCS | Mod: S$GLB,,, | Performed by: EMERGENCY MEDICINE

## 2023-04-13 PROCEDURE — 80305 DRUG TEST PRSMV DIR OPT OBS: CPT | Mod: S$GLB,,, | Performed by: EMERGENCY MEDICINE

## 2023-11-10 ENCOUNTER — HOSPITAL ENCOUNTER (EMERGENCY)
Facility: HOSPITAL | Age: 48
Discharge: HOME | End: 2023-11-11
Attending: EMERGENCY MEDICINE
Payer: COMMERCIAL

## 2023-11-10 DIAGNOSIS — S39.94XA SCROTAL INJURY, INITIAL ENCOUNTER: ICD-10-CM

## 2023-11-10 PROCEDURE — 99284 EMERGENCY DEPT VISIT MOD MDM: CPT

## 2023-11-11 VITALS
OXYGEN SATURATION: 97 % | SYSTOLIC BLOOD PRESSURE: 121 MMHG | HEART RATE: 78 BPM | TEMPERATURE: 98 F | WEIGHT: 165 LBS | HEIGHT: 68 IN | DIASTOLIC BLOOD PRESSURE: 66 MMHG | RESPIRATION RATE: 16 BRPM | BODY MASS INDEX: 25.01 KG/M2

## 2023-11-11 LAB
BILIRUB UR QL STRIP: NEGATIVE
CLARITY UR: CLEAR
COLOR UR: COLORLESS
GLUCOSE UR QL STRIP: NEGATIVE
HGB UR QL STRIP: NEGATIVE
KETONES UR QL STRIP: ABNORMAL
LEUKOCYTE ESTERASE UR QL STRIP: NEGATIVE
NITRITE UR QL STRIP: NEGATIVE
PH UR STRIP: 6 [PH] (ref 5–8)
PROT UR QL STRIP: NEGATIVE
SP GR UR STRIP: 1.01 (ref 1–1.03)
URN SPEC COLLECT METH UR: ABNORMAL
UROBILINOGEN UR STRIP-ACNC: NEGATIVE EU/DL

## 2023-11-11 PROCEDURE — 81003 URINALYSIS AUTO W/O SCOPE: CPT | Performed by: EMERGENCY MEDICINE

## 2023-11-11 RX ORDER — CEPHALEXIN 500 MG/1
500 CAPSULE ORAL 4 TIMES DAILY
Qty: 28 CAPSULE | Refills: 0 | Status: SHIPPED | OUTPATIENT
Start: 2023-11-11 | End: 2023-11-11 | Stop reason: SDUPTHER

## 2023-11-11 RX ORDER — CEPHALEXIN 500 MG/1
500 CAPSULE ORAL 4 TIMES DAILY
Qty: 28 CAPSULE | Refills: 0 | Status: SHIPPED | OUTPATIENT
Start: 2023-11-11 | End: 2023-11-18

## 2023-11-11 NOTE — ED PROVIDER NOTES
Encounter Date: 11/10/2023       History     Chief Complaint   Patient presents with    Testicle Pain     Shot self with nail gun in scrotum 5 hours PTA. Bleeding controled and nail removed.  Pt has Tetanus shot from [facility] 2 hours ago.       47-year-old male presents today after accidentally shooting himself in his scrotum with a nail gun.  Injury happened approximately 5 hours prior to arrival per patient.  Patient reports he noticed 2 small wounds in his scrotal sac.  Denies any significant pain or swelling.  Bleeding is controlled.  Urinating well.  Patient reports he went to [facility] for tetanus shot.  Patient states he is not having any more significant pain or symptoms and he was not planning on coming in but family member who is a nurse recommended come get evaluated.    The history is provided by the patient. No  was used.     Review of patient's allergies indicates:  No Known Allergies  Past Medical History:   Diagnosis Date    Chronic allergic rhinitis     Gastroesophageal reflux disease without esophagitis 10/18/2016    HPV in male     Rectal. Follow by Colonrectal     Recurrent upper respiratory infection (URI)     Vitamin D insufficiency 7/12/2019     Past Surgical History:   Procedure Laterality Date    CHOLECYSTECTOMY  2012    Outside facilty     Family History   Problem Relation Age of Onset    Diabetes Father 60    Obesity Father     Stroke Maternal Grandfather     Heart disease Paternal Grandfather 60    Heart disease Maternal Uncle 40        CABG     Social History     Tobacco Use    Smoking status: Never    Smokeless tobacco: Never   Substance Use Topics    Alcohol use: No    Drug use: No     Review of Systems    Physical Exam     Initial Vitals [11/10/23 2210]   BP Pulse Resp Temp SpO2   138/73 84 18 98.2 °F (36.8 °C) 96 %      MAP       --         Physical Exam    Constitutional: Vital signs are normal. He is not diaphoretic.  Non-toxic appearance. He does not  have a sickly appearance. No distress.   HENT:   Head: Normocephalic and atraumatic.   Eyes: Conjunctivae are normal.   Neck: Phonation normal. Neck supple. No thyromegaly present. No tracheal deviation present.   Cardiovascular:  Normal rate.           Pulmonary/Chest: No respiratory distress.   Abdominal: He exhibits no distension. There is no abdominal tenderness. There is no rebound and no guarding.   Genitourinary:    Penis normal.   Right testis shows no mass, no swelling and no tenderness. Right testis is descended. Cremasteric reflex is not absent on the right side. Left testis shows no mass, no swelling and no tenderness. Left testis is descended. Cremasteric reflex is not absent on the left side.    Genitourinary Comments: Two small pinpoint puncture wounds to the scrotum 1 at the base of the penis the other left lateral scrotum.  Superficial wound to left medial thigh.  Bleeding controlled.  No significant swelling or erythema.  No tenderness.  Otherwise normal penile and testicular exam     Musculoskeletal:      Cervical back: Neck supple.     Neurological: He is alert and oriented to person, place, and time.   Skin: Skin is warm, dry and intact. No pallor.   Psychiatric: He has a normal mood and affect. His speech is normal and behavior is normal. Thought content normal.         ED Course   Procedures  Labs Reviewed   URINALYSIS, REFLEX TO URINE CULTURE - Abnormal; Notable for the following components:       Result Value    Color, UA Colorless (*)     Ketones, UA 1+ (*)     All other components within normal limits    Narrative:     Specimen Source->Urine          Imaging Results              US Scrotum And Testicles (In process)                      Medications - No data to display  Medical Decision Making  The patient is suffering from testicular pain after puncture wound. Based on the history, exam, and testing, I do not suspect that the patient has testicular torsion, abscess, severe cellulitis,  Anthony's gangrene, or other emergent cause.    UA unremarkable  US Scrotum as above.   No bleeding.  Tetanus up-to-date.  No visible bruising swelling or significant tenderness.  No evidence of infection at this time.     Disposition: Plan follow up with primary care doctor for symptom re-check and referral to urology. Discussed return precautions at bedside. Discharge.        Amount and/or Complexity of Data Reviewed  Radiology: ordered. Decision-making details documented in ED Course.    Risk  Prescription drug management.               ED Course as of 11/11/23 0206   Fri Nov 10, 2023   2326 Discussed case with Dr. Vega, urology who agrees with plan for discharge with close outpatient follow-up. [BD]   Sat Nov 11, 2023   0102 US Scrotum And Testicles  IMPRESSION:  1. No gross testicular injuries are evident sonographically. No hematoma or retained foreign bodies.  Both testes demonstrate appropriate blood flow.  2. Small right and trace left simple hydroceles.  Dictated and Authenticated by: Toni Mason MD  11/11/2023 12:59 AM Central Time ([place] & [place]) [BD]   0205 Testicular ultrasound reassuring as above.  Testicular exam reassuring aside from small puncture wounds.  UA negative with no blood or signs of infection.  No evidence infection on exam.  Tetanus is up-to-date.  Will cover empirically with broad-spectrum antibiotics given nature of wound.  Discussed with urology who agrees with discharge and will follow up closely outpatient. [BD]      ED Course User Index  [BD] Jose Roberson MD                    Clinical Impression:   Final diagnoses:  [S39.94XA] Scrotal injury, initial encounter        ED Disposition Condition    Discharge Stable          ED Prescriptions       Medication Sig Dispense Start Date End Date Auth. Provider    cephALEXin (KEFLEX) 500 MG capsule Take 1 capsule (500 mg total) by mouth 4 (four) times daily. for 7 days 28 capsule 11/11/2023 11/18/2023 Jose Roberson MD           Follow-up Information       Follow up With Specialties Details Why Contact Info Additional Information    Nito Vega Jr., MD Urology Go in 2 days  [address] [place] [address] [address]  [place] [address]  [phone]       [facility] Emergency Medicine Go to  As needed, If symptoms worsen [address] [place] [address] [address] [place] [id number] 1st floor    Jorden Thakkar MD Internal Medicine, Hospitalist Go in 2 days For wound re-check [facility] [facility] [address]  [phone]                Jose Roberson MD  11/11/23 7711

## 2023-11-13 ENCOUNTER — TELEPHONE (OUTPATIENT)
Dept: FAMILY MEDICINE | Facility: CLINIC | Age: 48
End: 2023-11-13
Payer: COMMERCIAL

## 2023-11-17 ENCOUNTER — OFFICE VISIT (OUTPATIENT)
Dept: UROLOGY | Facility: CLINIC | Age: 48
End: 2023-11-17
Payer: COMMERCIAL

## 2023-11-17 VITALS
HEART RATE: 78 BPM | DIASTOLIC BLOOD PRESSURE: 66 MMHG | SYSTOLIC BLOOD PRESSURE: 121 MMHG | BODY MASS INDEX: 24.99 KG/M2 | WEIGHT: 164.88 LBS | HEIGHT: 68 IN

## 2023-11-17 DIAGNOSIS — S39.94XA SCROTAL INJURY, INITIAL ENCOUNTER: ICD-10-CM

## 2023-11-17 PROCEDURE — 99202 PR OFFICE/OUTPT VISIT, NEW, LEVL II, 15-29 MIN: ICD-10-PCS | Mod: S$GLB,,, | Performed by: NURSE PRACTITIONER

## 2023-11-17 PROCEDURE — 99213 OFFICE O/P EST LOW 20 MIN: CPT | Mod: PBBFAC,PO | Performed by: NURSE PRACTITIONER

## 2023-11-17 PROCEDURE — 99202 OFFICE O/P NEW SF 15 MIN: CPT | Mod: S$GLB,,, | Performed by: NURSE PRACTITIONER

## 2023-11-17 PROCEDURE — 99999 PR PBB SHADOW E&M-EST. PATIENT-LVL III: CPT | Mod: PBBFAC,,, | Performed by: NURSE PRACTITIONER

## 2023-11-17 PROCEDURE — 99999 PR PBB SHADOW E&M-EST. PATIENT-LVL III: ICD-10-PCS | Mod: PBBFAC,,, | Performed by: NURSE PRACTITIONER

## 2023-11-17 NOTE — PROGRESS NOTES
[facility] Note  Staff: YANCY Roe    PCP: N/A    Chief Complaint: ER F/UP-Scrotal Injury    Subjective:        HPI: Gayle Cline is a 47 y.o. male NEW PT presents today for ER F/UP at this time.    [facility] ED visit on 11/10/23:  47-year-old male presents after accidentally shooting himself in his scrotum with a nail gun.  Injury happened approximately 5 hours prior to arrival per patient.  Patient reports he noticed 2 small wounds in his scrotal sac.  Denies any significant pain or swelling.  Bleeding is controlled.  Urinating well.  Patient reports he went to [facility] for tetanus shot.  Patient states he is not having any more significant pain or symptoms and he was not planning on coming in but family member who is a nurse recommended come get evaluated.     US of Scrotum and Testicles  11/11/23:  No testicular torsion or mass.  Small right hydrocele.     REVIEW OF SYSTEMS:  A comprehensive 10 system review was performed and is negative except as noted above in HPI    PMHx:  Past Medical History:   Diagnosis Date    Chronic allergic rhinitis     Gastroesophageal reflux disease without esophagitis 10/18/2016    HPV in male     Rectal. Follow by Colonrectal     Recurrent upper respiratory infection (URI)     Vitamin D insufficiency 7/12/2019     PSHx:  Past Surgical History:   Procedure Laterality Date    CHOLECYSTECTOMY  2012    Outside facilty     Allergies:  Patient has no known allergies.    Medications: reviewed   Objective:     Vitals:    11/17/23 1258   BP: 121/66   Pulse: 78     General:WDWN in NAD  Eyes: PERRLA, normal conjunctiva  Respiratory: no increased work on breathing, clear to auscultation  Cardiovascular: regular rate and rhythm. No obvious extremity edema.  GI: palpation of masses. No tenderness. No hepatosplenomegaly to palpation.  Musculoskeletal: normal range of motion of bilateral upper extremities. Normal muscle strength and tone.  Skin: no  obvious rashes or lesions. No tightening of skin noted.  Neurologic: CN grossly normal. Normal sensation.   Psychiatric: awake, alert and oriented x 3. Mood and affect normal. Cooperative.     Exam performed by me during OV today:  Puncture sites to left upper thigh, left scrotal sac healing, clean dry and intact as of today's office visit.  No redness, no inflammation observed.  No drainage noted to sites.  No scrotal rashes, cysts or lesions  Epididymis normal in size, no tenderness  Testes normal and size, equal size bilaterally, no masses    Assessment:       1. Scrotal injury, initial encounter          Plan:     The following instructions were given to the patient to assist with discomfort:  -Use jockstrap or the best supportive underwear he can get for relief of pressure.  -Alternate ice packs/heating pad to areas.  -Take OTC anti-inflammatories  -Sit in a warm tub of water greater than 15 minutes  -Elevate Scrotal Sac     F/u as needed.    [facility]: Active    Evangelina Roberson, MONCHOC

## 2023-12-15 NOTE — PATIENT INSTRUCTIONS
Nail Fungal Infection  A nail fungal infection changes the way fingernails and toenails look. They may thicken, discolor, change shape, or split. This condition is hard to treat because nails grow slowly and have limited blood supply. The infection often comes back after treatment.  There are 2 types of medicines used to treat this condition:  Topical anti-fungal medicines. These are applied to the surface of the skin and nail area. These medicines are not very effective because they cant get deep into the nail.  Oral antifungal medicines. These medicines work better because they go into the nail from the inside out. But the infection may still come back. It may take 9 to 12 months for your nail to look normal again. This means you are cured. You can repeat treatment if needed. Most people take these medicines without any problems. It is rare to stop therapy because of side effects. But your healthcare provider may give you some monitoring tests. Talk about possible side effects with your provider before starting treatment.  If medicines fail, the nail can be removed surgically or chemically. These methods physically remove the fungus from the body. This helps medical treatment be more effective.  Home care  Use medicines exactly as directed for as long as directed. Treating a fungal infection can take longer than other kinds of infections.  Smoking is a risk factor for fungal infection. This is one more reason to quit.  Wear absorbent socks, and shoes that let your feet breathe. Sweaty feet increase your risk of fungal infection. They also make an existing infection harder to treat.  Use footwear when in damp public places like swimming pools, gyms, and shower rooms. This will help you avoid the fungus that grows there.  Don't share nail clippers or scissors with others.  Follow-up care  Follow up with your healthcare provider, or as advised.  When to seek medical advice  Call your healthcare provider right away  if any of these occur:  Skin by the nail becomes red, swollen, painful, or drains pus (a creamy yellow or white liquid)  Side effects from oral anti-fungal medicines  Date Last Reviewed: 8/1/2016  © 3312-5563 [employer]. [address] [place] [address]. All rights reserved. This information is not intended as a substitute for professional medical care. Always follow your healthcare professional's instructions.

## 2023-12-19 ENCOUNTER — OFFICE VISIT (OUTPATIENT)
Dept: PODIATRY | Facility: CLINIC | Age: 48
End: 2023-12-19
Attending: FAMILY MEDICINE
Payer: COMMERCIAL

## 2023-12-19 VITALS
HEIGHT: 68 IN | HEART RATE: 76 BPM | BODY MASS INDEX: 26.4 KG/M2 | RESPIRATION RATE: 16 BRPM | WEIGHT: 174.19 LBS | OXYGEN SATURATION: 97 %

## 2023-12-19 DIAGNOSIS — B35.1 ONYCHOMYCOSIS DUE TO DERMATOPHYTE: Primary | ICD-10-CM

## 2023-12-19 PROCEDURE — 99203 PR OFFICE/OUTPT VISIT, NEW, LEVL III, 30-44 MIN: ICD-10-PCS | Mod: S$GLB,,, | Performed by: PODIATRIST

## 2023-12-19 PROCEDURE — 99203 OFFICE O/P NEW LOW 30 MIN: CPT | Mod: S$GLB,,, | Performed by: PODIATRIST

## 2023-12-19 PROCEDURE — 99999 PR PBB SHADOW E&M-EST. PATIENT-LVL III: ICD-10-PCS | Mod: PBBFAC,,, | Performed by: PODIATRIST

## 2023-12-19 PROCEDURE — 99999 PR PBB SHADOW E&M-EST. PATIENT-LVL III: CPT | Mod: PBBFAC,,, | Performed by: PODIATRIST

## 2023-12-19 RX ORDER — FLUCONAZOLE 200 MG/1
200 TABLET ORAL WEEKLY
Qty: 28 TABLET | Refills: 0 | Status: SHIPPED | OUTPATIENT
Start: 2023-12-19 | End: 2024-06-26

## 2023-12-19 NOTE — PROGRESS NOTES
"  [address] [facility] [address] [place] [address]  Phone: [phone]   [phone]    Patient's PCP:Jorden Thakkar MD  Referring Provider: Aaareferral Self    Subjective:      Chief Complaint:: Nail Problem (Fungal nails)    HIRAL Cline is a 47 y.o. male who presents today with a complaint of bilateral nail fungus. The current episode started years ago.  The symptoms include discoloration thickening and brittle nails. The problem has worsened. Treatment to date have included topical penlac which provided no relief.     Vitals:    12/19/23 1611   Pulse: 76   Resp: 16   SpO2: 97%   Weight: 79 kg (174 lb 2.6 oz)   Height: 5' 8" (1.727 m)   PainSc: 0-No pain      Shoe Size:     Past Surgical History:   Procedure Laterality Date    CHOLECYSTECTOMY  2012    Outside facilty     Past Medical History:   Diagnosis Date    Chronic allergic rhinitis     Gastroesophageal reflux disease without esophagitis 10/18/2016    HPV in male     Rectal. Follow by Colonrectal     Recurrent upper respiratory infection (URI)     Vitamin D insufficiency 7/12/2019     Family History   Problem Relation Age of Onset    Diabetes Father 60    Obesity Father     Stroke Maternal Grandfather     Heart disease Paternal Grandfather 60    Heart disease Maternal Uncle 40        CABG        Social History:   Marital Status:   Alcohol History:  reports no history of alcohol use.  Tobacco History:  reports that he has never smoked. He has never used smokeless tobacco.  Drug History:  reports no history of drug use.    Review of patient's allergies indicates:  No Known Allergies    Current Outpatient Medications   Medication Sig Dispense Refill    albuterol (VENTOLIN HFA) 90 mcg/actuation inhaler Inhale 1-2 puffs into the lungs every 6 (six) hours as needed for Wheezing or Shortness of Breath. Rescue (Patient not taking: Reported on 11/17/2023) 18 g 0    ascorbic acid, vitamin C, (VITAMIN C) 1000 MG tablet Take 1 tablet (1,000 mg " total) by mouth once daily. (Patient not taking: Reported on 11/17/2023)      cholecalciferol, vitamin D3, (VITAMIN D3) 2,000 unit Cap Take 1 capsule (2,000 Units total) by mouth once daily. (Patient not taking: Reported on 11/17/2023) 90 capsule 3    fluconazole (DIFLUCAN) 200 MG Tab Take 1 tablet (200 mg total) by mouth once a week. for 28 doses 28 tablet 0    fluticasone propionate (FLONASE) 50 mcg/actuation nasal spray 1 spray (50 mcg total) by Each Nostril route once daily. Take during Spring and Fall. (Patient not taking: Reported on 12/19/2023) 48 g 3     No current facility-administered medications for this visit.       Review of Systems   Constitutional:  Negative for chills, fatigue, fever and unexpected weight change.   HENT:  Negative for hearing loss and trouble swallowing.    Eyes:  Negative for photophobia and visual disturbance.   Respiratory:  Negative for cough, shortness of breath and wheezing.    Cardiovascular:  Negative for chest pain, palpitations and leg swelling.   Gastrointestinal:  Negative for abdominal pain and nausea.   Genitourinary:  Negative for dysuria and frequency.   Musculoskeletal:  Negative for arthralgias, back pain, gait problem, joint swelling and myalgias.   Skin:  Negative for rash and wound.   Neurological:  Negative for tremors, seizures, speech difficulty, weakness and headaches.   Hematological:  Does not bruise/bleed easily.         Objective:        Physical Exam:   Foot Exam    General  General Appearance: appears stated age and healthy   Orientation: alert and oriented to person, place, and time   Affect: appropriate   Gait: unimpaired       Right Foot/Ankle     Inspection and Palpation  Ecchymosis: none  Tenderness: none   Swelling: none   Arch: normal  Skin Exam: skin intact; no drainage, no ulcer and no erythema   Fungus Toenails: present    Neurovascular  Dorsalis pedis: 2+  Posterior tibial: 2+  Capillary Refill: 2+  Varicose veins: not present  Saphenous  nerve sensation: normal  Tibial nerve sensation: normal  Superficial peroneal nerve sensation: normal  Deep peroneal nerve sensation: normal  Sural nerve sensation: normal    Edema  Type of edema: non-pitting    Muscle Strength  Ankle dorsiflexion: 5  Ankle plantar flexion: 5  Ankle inversion: 5  Ankle eversion: 5  Great toe extension: 5  Great toe flexion: 5    Range of Motion    Normal right ankle ROM    Tests  Anterior drawer: negative   Talar tilt: negative   PT Tinel's sign: negative    Paresthesia: negative  Comments  Thickening discoloration and dystrophy of the great toenail    Left Foot/Ankle      Inspection and Palpation  Ecchymosis: none  Tenderness: none   Swelling: none   Arch: normal  Skin Exam: skin intact; no drainage, no ulcer and no erythema   Fungus Toenails: present    Neurovascular  Dorsalis pedis: 2+  Posterior tibial: 2+  Capillary refill: 2+  Varicose veins: not present  Saphenous nerve sensation: normal  Tibial nerve sensation: normal  Superficial peroneal nerve sensation: normal  Deep peroneal nerve sensation: normal  Sural nerve sensation: normal    Edema  Type of edema: non-pitting    Muscle Strength  Ankle dorsiflexion: 5  Ankle plantar flexion: 5  Ankle inversion: 5  Ankle eversion: 5  Great toe extension: 5  Great toe flexion: 5    Range of Motion    Normal left ankle ROM    Tests  Anterior drawer: negative   Talar tilt: negative   PT Tinel's sign: negative  Paresthesia: negative  Comments  Very slight discoloration of the great toenail    Physical Exam  Cardiovascular:      Pulses:           Dorsalis pedis pulses are 2+ on the right side and 2+ on the left side.        Posterior tibial pulses are 2+ on the right side and 2+ on the left side.   Feet:      Right foot:      Skin integrity: No ulcer or erythema.      Toenail Condition: Fungal disease present.     Left foot:      Skin integrity: No ulcer or erythema.      Toenail Condition: Fungal disease present.              Right  Ankle/Foot Exam     Range of Motion   The patient has normal right ankle ROM.    Comments:  Thickening discoloration and dystrophy of the great toenail    Left Ankle/Foot Exam     Range of Motion   The patient has normal left ankle ROM.     Comments:  Very slight discoloration of the great toenail      Muscle Strength   Right Lower Extremity   Ankle Dorsiflexion:  5   Plantar flexion:  5/5  Left Lower Extremity   Ankle Dorsiflexion:  5   Plantar flexion:  5/5     Vascular Exam     Right Pulses  Dorsalis Pedis:      2+  Posterior Tibial:      2+        Left Pulses  Dorsalis Pedis:      2+  Posterior Tibial:      2+           Imaging: none            Assessment:       1. Onychomycosis due to dermatophyte      Plan:   Onychomycosis due to dermatophyte  -     fluconazole (DIFLUCAN) 200 MG Tab; Take 1 tablet (200 mg total) by mouth once a week. for 28 doses  Dispense: 28 tablet; Refill: 0      Follow up if symptoms worsen or fail to improve.    Procedures        Fungal infection of toenails explained. Treatment options including no treatment, periodic debridement, topical medications, oral medications, and removal of the nail were discussed, as well as success rates and risks of recurrence.  I am going to send in fluconazole for him.      Counseling:     I provided patient education verbally regarding:   Patient diagnosis, treatment options, as well as alternatives, risks, and benefits.     This note was created using Dragon voice recognition software that occasionally misinterpreted phrases or words.

## 2024-03-25 NOTE — PATIENT INSTRUCTIONS
Instructions for Patients with Confirmed or Suspected COVID-19    If you are awaiting your test result, you will either be called or it will be released to the patient portal.  If you have any questions about your test, please visit [email or web address] or call our COVID-19 information line at [phone].      Instructions for non-hospitalized or discharged patients with confirmed or suspected COVID-19:       Stay home except to get medical care.    Separate yourself from other people and animals in your home.    Call ahead before visiting your doctor.    Wear a face mask.    Cover your coughs and sneezes.    Clean your hands often.    Avoid sharing personal household items.    Clean all high-touch surfaces every day.    Monitor your symptoms. Seek prompt medical attention if your illness is worsening (e.g., difficulty breathing). Before seeking care, call your healthcare provider.    If you have a medical emergency and must call 911, notify the dispatcher that you have or are being evaluated for COVID-19. If possible, put on a face mask before emergency medical services arrive.    Use the following symptom-based strategy to return to normal activity following a suspected or confirmed case of COVID-19. Continue isolation until:   o At least 3 days (72 hours) have passed since recovery defined as resolution of fever without the use of fever-reducing medications and improvement in respiratory symptoms (e.g. cough, shortness of breath), and   o At least 10 days have passed since the first positive test.       As one of the next steps, you will receive a call or text from the [place] Department of Health ([facility]) COVID-19 Tracing Team. See the contact information below so you know not to ignore the health departments call. It is important that you contact them back immediately so they can help.     Contact Tracer Number:  [phone]  Caller ID for most carriers: [place] [facility]    What is  contact tracing?   Contact tracing is a process that helps identify everyone who has been in close contact with an infected person. Contact tracers let those people know they may have been exposed and guide them on next steps. Confidentiality is important for everyone; no one will be told who may have exposed them to the virus.   Your involvement is important. The more we know about where and how this virus is spreading, the better chance we have at stopping it from spreading further.  What does exposure mean?   Exposure means you have been within 6 feet for more than 15 minutes with a person who has or had COVID-19.  What kind of questions do the contact tracers ask?   A contact tracer will confirm your basic contact information including name, address, phone number, and next of kin, as well as asking about any symptoms you may have had. Theyll also ask you how you think you may have gotten sick, such as places where you may have been exposed to the virus, and people you were with. Those names will never be shared with anyone outside of that call, and will only be used to help trace and stop the spread of the virus.   I have privacy concerns. How will the state use my information?   Your privacy about your health is important. All calls are completed using call centers that use the appropriate health privacy protection measures (HIPAA compliance), meaning that your patient information is safe. No one will ever ask you any questions related to immigration status. Your health comes first.   Do I have to participate?   You do not have to participate, but we strongly encourage you to. Contact tracing can help us catch and control new outbreaks as theyre developing to keep your friends and family safe.   What if I dont hear from anyone?   If you dont receive a call within 24 hours, you can call the number above right away to inquire about your status. That line is open from 8:00 am - 8:00 p.m., 7 days a  week.  Contact tracing saves lives! Together, we have the power to beat this virus and keep our loved ones and neighbors safe.       Instructions for household members, intimate partners and caregivers in a non-healthcare setting of a patient with confirmed or suspected COVID-19:         Close contacts should monitor their health and call their healthcare provider right away if they develop symptoms suggestive of COVID-19 (e.g., fever, cough, shortness of breath).    Stay home except to get medical care. Separate yourself from other people and animals in the home.   Monitor the patients symptoms. If the patient is getting sicker, call his or her healthcare provider. If the patient has a medical emergency and you need to call 911, notify the dispatch personnel that the patient has or is being evaluated for COVID-19.    Wear a facemask when around other people such as sharing a room or vehicle and before entering a healthcare provider's office.   Cover coughs and sneezes with a tissue. Throw used tissues in a lined trash can immediately and wash hands.   Clean hands often with soap and water for at least 20 seconds or with an alcohol-based hand , rubbing hands together until they feel dry. Avoid touching your eyes, nose, and mouth with unwashed hands.   Clean all high-touch; surfaces every day, including counters, tabletops, doorknobs, bathroom fixtures, toilets, phones, keyboards, tablets, bedside tables, etc. Use a household cleaning spray or wipe according to label instructions.   Avoid sharing personal household items such as dishes, drinking glasses, cups, towels, bedding, etc. After these items are used, they should be washed thoroughly with soap and water.   Continue isolation until:   At least 3 days (72 hours) have passed since recovery defined as resolution of fever without the use of fever-reducing medications and improvement in respiratory symptoms (e.g. cough, shortness of breath),  and    At least 10 days have passed since the patients first positive test.    https://www.cdc.gov/coronavirus/2019-ncov/your-health/index.htm     [Bowel Management] : bowel management [Fluid Management] : fluid management [Bladder Training] : bladder training [FreeTextEntry1] : Sterile straight catheterization was performed to measure a postvoid residual volume which was 30 cc

## 2024-06-05 DIAGNOSIS — B35.1 ONYCHOMYCOSIS DUE TO DERMATOPHYTE: ICD-10-CM

## 2024-06-06 RX ORDER — FLUCONAZOLE 200 MG/1
200 TABLET ORAL WEEKLY
Qty: 12 TABLET | Refills: 2 | OUTPATIENT
Start: 2024-06-06 | End: 2024-12-13

## 2024-12-29 ENCOUNTER — OFFICE VISIT (OUTPATIENT)
Dept: URGENT CARE | Facility: CLINIC | Age: 49
End: 2024-12-29
Payer: COMMERCIAL

## 2024-12-29 VITALS
RESPIRATION RATE: 18 BRPM | HEART RATE: 87 BPM | DIASTOLIC BLOOD PRESSURE: 74 MMHG | TEMPERATURE: 98 F | OXYGEN SATURATION: 97 % | BODY MASS INDEX: 26.37 KG/M2 | WEIGHT: 174 LBS | SYSTOLIC BLOOD PRESSURE: 114 MMHG | HEIGHT: 68 IN

## 2024-12-29 DIAGNOSIS — L02.429: Primary | ICD-10-CM

## 2024-12-29 DIAGNOSIS — L73.9 FOLLICULITIS: ICD-10-CM

## 2024-12-29 PROCEDURE — 99214 OFFICE O/P EST MOD 30 MIN: CPT | Mod: S$GLB,,,

## 2024-12-29 RX ORDER — DOXYCYCLINE 100 MG/1
100 CAPSULE ORAL 2 TIMES DAILY
Qty: 20 CAPSULE | Refills: 0 | Status: SHIPPED | OUTPATIENT
Start: 2024-12-29 | End: 2025-01-08

## 2024-12-29 RX ORDER — MUPIROCIN 20 MG/G
OINTMENT TOPICAL 3 TIMES DAILY
Qty: 15 G | Refills: 0 | Status: SHIPPED | OUTPATIENT
Start: 2024-12-29

## 2024-12-29 NOTE — PATIENT INSTRUCTIONS
Thank you for allowing me to be part of your healthcare team at [facility]. It is a pleasure to care for you today.   Please take all of your medications as instructed and follow all new instructions from your visit today.  If you received labs or medical tests today you should hear information about results or scheduling either by phone or mychart within approximately a week.   If you have any questions or concerns please do not hesitate to call. Have a blessed day.   BRANDO Gaxiola

## 2024-12-29 NOTE — PROGRESS NOTES
"Subjective:      Patient ID: Gayle Cline is a 49 y.o. male.    Vitals:  height is 5' 8" (1.727 m) and weight is 78.9 kg (174 lb). His oral temperature is 98.1 °F (36.7 °C). His blood pressure is 114/74 and his pulse is 87. His respiration is 18 and oxygen saturation is 97%.     Chief Complaint: Knee Pain    Patient presents to the clinic with complaint of possible Staph infection.     States he thought he had an ingrown hair on right knee. States he popped the area and it has drained purulent drainage.   States now the area is red and painful.     Knee Pain   The incident occurred 3 to 5 days ago. The incident occurred at home. There was no injury mechanism. The pain is present in the right knee. The pain is at a severity of 7/10. The pain is moderate. The pain has been Constant since onset. He reports no foreign bodies present. The symptoms are aggravated by movement. He has tried ice (bactroban/popped sore) for the symptoms. The treatment provided no relief.       Constitution: Negative for appetite change, chills, sweating, fatigue, fever and generalized weakness.   HENT:  Negative for ear pain, congestion, postnasal drip, sinus pain, sinus pressure, sore throat, trouble swallowing and voice change.    Neck: Negative for neck pain, neck stiffness, painful lymph nodes and neck swelling.   Cardiovascular:  Negative for chest pain, leg swelling and palpitations.   Respiratory:  Negative for chest tightness, cough, shortness of breath and wheezing.    Gastrointestinal:  Negative for abdominal pain, nausea, vomiting, constipation and diarrhea.   Genitourinary:  Negative for dysuria, frequency, urgency and urine decreased.   Skin:  Positive for wound. Negative for color change and pale.   Allergic/Immunologic: Negative for chronic cough.   Neurological:  Negative for dizziness, headaches, disorientation and altered mental status.   Hematologic/Lymphatic: Negative for swollen lymph nodes.   Psychiatric/Behavioral:  " Negative for altered mental status, disorientation and confusion.       Objective:     Physical Exam   Constitutional: He is oriented to person, place, and time. He appears well-developed. He is cooperative.   HENT:   Head: Normocephalic and atraumatic.   Ears:   Right Ear: Hearing and external ear normal.   Left Ear: Hearing and external ear normal.   Nose: Nose normal. No mucosal edema or nasal deformity. No epistaxis. Right sinus exhibits no maxillary sinus tenderness and no frontal sinus tenderness. Left sinus exhibits no maxillary sinus tenderness and no frontal sinus tenderness.   Mouth/Throat: Uvula is midline, oropharynx is clear and moist and mucous membranes are normal. No trismus in the jaw. Normal dentition. No uvula swelling.   Eyes: Conjunctivae and lids are normal.   Neck: Trachea normal and phonation normal.   Cardiovascular: Normal rate and normal pulses.   Pulmonary/Chest: Effort normal.   Abdominal: Normal appearance.   Musculoskeletal: Normal range of motion.         General: Normal range of motion.   Neurological: He is alert and oriented to person, place, and time. He exhibits normal muscle tone.   Skin: Skin is warm, dry and intact.         Comments: See picture- Wound culture obtained- purulent drainage noted   Psychiatric: His speech is normal and behavior is normal. Judgment and thought content normal.   Nursing note and vitals reviewed.      Assessment:     1. Boil, knee    2. Folliculitis        Plan:       Boil, knee  -     Aerobic culture  -     doxycycline (VIBRAMYCIN) 100 MG Cap; Take 1 capsule (100 mg total) by mouth 2 (two) times daily. for 10 days  Dispense: 20 capsule; Refill: 0  -     mupirocin (BACTROBAN) 2 % ointment; Apply topically 3 (three) times daily.  Dispense: 15 g; Refill: 0    Folliculitis     - Wound culture ordered   - Cleanse area with antibacterial soap and water, dry, apply Mupirocin BID.   - Take medications as prescribed.  - Assure adequate hydration.  -  Follow-up with PCP in 1-2 days.  - Return to clinic as needed.  - To ED for any new or acutely worsening symptoms including but not limited to chest pain, palpitations, shortness of breath, or fever greater than 103° F.  Patient in agreement with plan of care.     - The diagnosis, treatment plan, instructions for follow-up and reevaluation as well as ED precautions were discussed and understanding was verbalized. All questions or concerns have been addressed.

## 2025-01-02 LAB
BACTERIA SPEC CULT: ABNORMAL
MICROORGANISM/AGENT SPEC: ABNORMAL
OTHER ANTIBIOTIC SUSC ISLT: ABNORMAL